# Patient Record
Sex: FEMALE | Race: WHITE | Employment: FULL TIME | URBAN - METROPOLITAN AREA
[De-identification: names, ages, dates, MRNs, and addresses within clinical notes are randomized per-mention and may not be internally consistent; named-entity substitution may affect disease eponyms.]

---

## 2017-03-27 ENCOUNTER — TELEPHONE (OUTPATIENT)
Dept: CARDIOLOGY CLINIC | Age: 52
End: 2017-03-27

## 2017-03-27 DIAGNOSIS — I10 ESSENTIAL HYPERTENSION, BENIGN: ICD-10-CM

## 2017-03-27 DIAGNOSIS — E78.5 DYSLIPIDEMIA: Primary | ICD-10-CM

## 2017-03-27 RX ORDER — ROSUVASTATIN CALCIUM 5 MG/1
TABLET, COATED ORAL
Qty: 1 TAB | Refills: 0 | OUTPATIENT
Start: 2017-03-27

## 2017-03-27 RX ORDER — POTASSIUM CHLORIDE 20 MEQ/1
TABLET, EXTENDED RELEASE ORAL
Qty: 1 TAB | Refills: 0 | OUTPATIENT
Start: 2017-03-27

## 2017-03-27 NOTE — TELEPHONE ENCOUNTER
Spoke with patient. Verified patient with two patient identifiers. Received refill request for K+ and Crestor. Overdue for appt. States she has been off Crestor, lost weight and wants to see if she can control chol off Crestor. States she does not need refill on K+ at this time. Wants CMP done as well as fasting chol. Will mail lab slip to pt. She is calling to schedule appt. Patient verbalized understanding.

## 2017-04-07 RX ORDER — ROSUVASTATIN CALCIUM 5 MG/1
TABLET, COATED ORAL
Qty: 1 TAB | Refills: 0 | OUTPATIENT
Start: 2017-04-07

## 2017-04-07 RX ORDER — POTASSIUM CHLORIDE 20 MEQ/1
TABLET, EXTENDED RELEASE ORAL
Qty: 2 TAB | Refills: 0 | OUTPATIENT
Start: 2017-04-07

## 2017-04-07 NOTE — TELEPHONE ENCOUNTER
Overdue for appt, still has not scheduled appt. And, see prior charted note. Pt does not need either med refilled.

## 2017-04-14 ENCOUNTER — HOSPITAL ENCOUNTER (OUTPATIENT)
Dept: MRI IMAGING | Age: 52
Discharge: HOME | End: 2017-04-14
Attending: FAMILY MEDICINE
Payer: COMMERCIAL

## 2017-04-14 ENCOUNTER — HOSPITAL ENCOUNTER (OUTPATIENT)
Dept: MAMMOGRAPHY | Age: 52
Discharge: HOME | End: 2017-04-14
Attending: FAMILY MEDICINE
Payer: COMMERCIAL

## 2017-04-14 DIAGNOSIS — Z80.3 FAMILY HISTORY OF BREAST CANCER: ICD-10-CM

## 2017-04-14 PROCEDURE — 77066 DX MAMMO INCL CAD BI: CPT

## 2017-04-14 PROCEDURE — 74011250636 HC RX REV CODE- 250/636: Performed by: FAMILY MEDICINE

## 2017-04-14 PROCEDURE — 77059 MRI BREAST BI W WO CONT: CPT

## 2017-04-14 PROCEDURE — A9585 GADOBUTROL INJECTION: HCPCS | Performed by: FAMILY MEDICINE

## 2017-04-14 RX ADMIN — GADOBUTROL 8 ML: 604.72 INJECTION INTRAVENOUS at 10:32

## 2017-05-02 LAB
ALBUMIN SERPL-MCNC: 3.9 G/DL (ref 3.5–5.5)
ALBUMIN/GLOB SERPL: 1.5 {RATIO} (ref 1.2–2.2)
ALP SERPL-CCNC: 51 IU/L (ref 39–117)
ALT SERPL-CCNC: 28 IU/L (ref 0–32)
AST SERPL-CCNC: 23 IU/L (ref 0–40)
BILIRUB SERPL-MCNC: 0.5 MG/DL (ref 0–1.2)
BUN SERPL-MCNC: 16 MG/DL (ref 6–24)
BUN/CREAT SERPL: 22 (ref 9–23)
CALCIUM SERPL-MCNC: 9.2 MG/DL (ref 8.7–10.2)
CHLORIDE SERPL-SCNC: 101 MMOL/L (ref 96–106)
CHOLEST SERPL-MCNC: 196 MG/DL (ref 100–199)
CK SERPL-CCNC: 64 U/L (ref 24–173)
CO2 SERPL-SCNC: 28 MMOL/L (ref 18–29)
CREAT SERPL-MCNC: 0.74 MG/DL (ref 0.57–1)
GLOBULIN SER CALC-MCNC: 2.6 G/DL (ref 1.5–4.5)
GLUCOSE SERPL-MCNC: 91 MG/DL (ref 65–99)
HDLC SERPL-MCNC: 47 MG/DL
INTERPRETATION, 910389: NORMAL
LDLC SERPL CALC-MCNC: 132 MG/DL (ref 0–99)
POTASSIUM SERPL-SCNC: 4 MMOL/L (ref 3.5–5.2)
PROT SERPL-MCNC: 6.5 G/DL (ref 6–8.5)
SODIUM SERPL-SCNC: 145 MMOL/L (ref 134–144)
TRIGL SERPL-MCNC: 86 MG/DL (ref 0–149)
VLDLC SERPL CALC-MCNC: 17 MG/DL (ref 5–40)

## 2017-05-03 ENCOUNTER — TELEPHONE (OUTPATIENT)
Dept: CARDIOLOGY CLINIC | Age: 52
End: 2017-05-03

## 2017-05-03 ENCOUNTER — OFFICE VISIT (OUTPATIENT)
Dept: CARDIOLOGY CLINIC | Age: 52
End: 2017-05-03

## 2017-05-03 VITALS
BODY MASS INDEX: 28.65 KG/M2 | OXYGEN SATURATION: 99 % | WEIGHT: 167.8 LBS | DIASTOLIC BLOOD PRESSURE: 86 MMHG | SYSTOLIC BLOOD PRESSURE: 138 MMHG | RESPIRATION RATE: 18 BRPM | HEART RATE: 80 BPM | HEIGHT: 64 IN

## 2017-05-03 DIAGNOSIS — E78.5 DYSLIPIDEMIA: ICD-10-CM

## 2017-05-03 DIAGNOSIS — I50.42 CHRONIC COMBINED SYSTOLIC AND DIASTOLIC HEART FAILURE (HCC): ICD-10-CM

## 2017-05-03 DIAGNOSIS — I42.9 SECONDARY CARDIOMYOPATHY (HCC): ICD-10-CM

## 2017-05-03 DIAGNOSIS — I10 ESSENTIAL HYPERTENSION, BENIGN: Primary | ICD-10-CM

## 2017-05-03 NOTE — PROGRESS NOTES
Chief Complaint   Patient presents with    Hypertension     Yearly appt, discuss cholesterol results. Denied cardiac symptoms.

## 2017-05-03 NOTE — TELEPHONE ENCOUNTER
----- Message from Paris Medina MD sent at 5/2/2017 10:17 PM EDT -----  Regarding: lipids  Not at goal--way up    Is she taking??    If so needs to be increased and repeat  Months    b  ----- Message -----     From: Rito Olson Lab Results In     Sent: 5/2/2017   8:38 AM       To: Paris Medina MD

## 2017-05-03 NOTE — MR AVS SNAPSHOT
Visit Information Date & Time Provider Department Dept. Phone Encounter #  
 5/3/2017  3:15 PM Elsa Doyle, [address] [place] [address] Cardiology Associate [facility] [phone] [id number] Upcoming Health Maintenance Date Due Hepatitis C Screening 1965 Pneumococcal 19-64 Medium Risk (1 of 1 - PPSV23) 1/21/1984 DTaP/Tdap/Td series (1 - Tdap) 1/21/1986 PAP AKA CERVICAL CYTOLOGY 1/21/1986 FOBT Q 1 YEAR AGE 50-75 1/21/2015 INFLUENZA AGE 9 TO ADULT 8/1/2017 BREAST CANCER SCRN MAMMOGRAM 4/14/2019 Allergies as of 5/3/2017  Review Complete On: 5/3/2017 By: Mar Zabala LPN No Known Allergies Current Immunizations  Never Reviewed No immunizations on file. Not reviewed this visit You Were Diagnosed With   
  
 Codes Comments Essential hypertension, benign    -  Primary ICD-10-CM: I10 
ICD-9-CM: 401.1 Vitals BP Pulse Resp Height(growth percentile) Weight(growth percentile) SpO2  
 138/86 (BP 1 Location: Right arm, BP Patient Position: Sitting) 80 18 5' 4\" (1.626 m) 167 lb 12.8 oz (76.1 kg) 99% BMI OB Status Smoking Status 28.8 kg/m2 Having regular periods Never Smoker Vitals History BMI and BSA Data Body Mass Index Body Surface Area  
 28.8 kg/m 2 1.85 m 2 Preferred Pharmacy Pharmacy Name Phone [id number] [address] [place] [address] [phone] Your Updated Medication List  
  
   
This list is accurate as of: 5/3/17  3:58 PM.  Always use your most recent med list.  
  
  
  
  
 aspirin 81 mg tablet Take 81 mg by mouth daily. CENTRUM CARDIO 3-200-400 mg-mcg-mg Tab Generic drug:  Mv,Ca,Iron,Min-FA-Phytosterol Take  by mouth. cholecalciferol (vitamin D3) 2,000 unit Tab Take  by mouth. COREG 6.25 mg tablet Generic drug:  carvedilol  
take 1 tablet by mouth once daily COZAAR 50 mg tablet Generic drug:  losartan take 1 tablet by mouth once daily  
  
 hydroCHLOROthiazide 25 mg tablet Commonly known as:  HYDRODIURIL  
take 1 tablet by mouth once daily  
  
 krill oil 500 mg Cap Take  by mouth daily. ondansetron 4 mg disintegrating tablet Commonly known as:  ZOFRAN ODT Take 1 Tab by mouth every eight (8) hours as needed for Nausea. potassium chloride 20 mEq tablet Commonly known as:  K-DUR, KLOR-CON  
take 1 tablet by mouth twice a day  
  
 rosuvastatin 5 mg tablet Commonly known as:  CRESTOR  
take 1 tablet by mouth every evening UBIQUINONE PO Take  by mouth daily. We Performed the Following AMB POC EKG ROUTINE W/ 12 LEADS, INTER & REP [58758 CPT(R)] Introducing [facility] SERVICES! Dear Bret Tellez: Thank you for requesting a [employer] account. Our records indicate that you already have an active [employer] account. You can access your account anytime at [email or web address] Did you know that you can access your hospital and ER discharge instructions at any time in [employer]? You can also review all of your test results from your hospital stay or ER visit. Additional Information If you have questions, please visit the Frequently Asked Questions section of the [employer] website at [email or web address] Remember, [employer] is NOT to be used for urgent needs. For medical emergencies, dial 911. Now available from your iPhone and Android! Please provide this summary of care documentation to your next provider. Your primary care clinician is listed as [facility]. If you have any questions after today's visit, please call [phone].

## 2017-05-04 ENCOUNTER — TELEPHONE (OUTPATIENT)
Dept: CARDIOLOGY CLINIC | Age: 52
End: 2017-05-04

## 2017-05-04 PROBLEM — R79.89 ELEVATED LFTS: Status: ACTIVE | Noted: 2017-05-04

## 2017-05-04 PROBLEM — R79.89 ELEVATED LFTS: Status: RESOLVED | Noted: 2017-05-04 | Resolved: 2017-05-04

## 2017-05-04 NOTE — PROGRESS NOTES
[address] [facility]        [phone]                             NEW PATIENT HPI/FOLLOW-UP    NAME:  Quynh Dalal   :   1965   MRN:   [id number]   PCP:  Chrissy Bob MD           Subjective: The patient is a 46y.o. year old female  who returns for a routine follow-up yearly f/u. Since the last visit, patient reports no new symptoms. Has remarkably lost >20 lbs on different diet. Denies change in exercise tolerance, chest pain, edema, medication intolerance, palpitations, shortness of breath, PND/orthopnea wheezing, sputum, syncope, dizziness or light headedness. Doing satisfactorily. Review of Systems  General: Endorses controlled weight loss. Pt is able to conduct ADL's. Respiratory: Denies shortness of breath, HAMMOND, wheezing or stridor. Cardiovascular: Denies precordial pain, palpitations, edema or PND  Gastrointestinal: Denies poor appetite, indigestion, abdominal pain or blood in stool  Peripheral vascular: Denies claudication, leg cramps  Neuropsychiatric: Denies paresthesias,tingling,numbness,anxiety,depression,fatigue  Musculoskeletal: Denies pain,tenderness, soreness,swelling      Past Medical History:   Diagnosis Date    Edema     Essential hypertension     Essential hypertension, benign     well controlled    Mitral valve disorders     Mixed hyperlipidemia      Patient Active Problem List    Diagnosis Date Noted    Obesity 2014    Essential hypertension, benign 2012    Dyslipidemia 2012    Secondary cardiomyopathy--resolved 2012    Chronic combined systolic and diastolic heart failure--resolved 2012    Nephrolithiasis 2012      No past surgical history on file.   No Known Allergies   Family History   Problem Relation Age of Onset    Breast Cancer Mother 52      Social History     Social History    Marital status:      Spouse name: N/A    Number of children: N/A    Years of education: N/A     Occupational History    Not on file. Social History Main Topics    Smoking status: Never Smoker    Smokeless tobacco: Never Used    Alcohol use No    Drug use: Not on file    Sexual activity: Not on file     Other Topics Concern    Not on file     Social History Narrative      Current Outpatient Prescriptions   Medication Sig    COZAAR 50 mg tablet take 1 tablet by mouth once daily    hydrochlorothiazide (HYDRODIURIL) 25 mg tablet take 1 tablet by mouth once daily    COREG 6.25 mg tablet take 1 tablet by mouth once daily    potassium chloride (K-DUR, KLOR-CON) 20 mEq tablet take 1 tablet by mouth twice a day    krill oil 500 mg cap Take  by mouth daily.  Mv,Ca,Iron,Min-FA-Phytosterol (CENTRUM CARDIO) 3-200-400 mg-mcg-mg Tab Take  by mouth.  aspirin 81 mg tablet Take 81 mg by mouth daily.  ondansetron (ZOFRAN ODT) 4 mg disintegrating tablet Take 1 Tab by mouth every eight (8) hours as needed for Nausea.  rosuvastatin (CRESTOR) 5 mg tablet take 1 tablet by mouth every evening    UBIQUINONE PO Take  by mouth daily.  cholecalciferol, vitamin D3, 2,000 unit Tab Take  by mouth. No current facility-administered medications for this visit. I have reviewed the nurses notes, vitals, problem list, allergy list, medical history, family medical, social history and medications. Objective:     Physical Exam:     Vitals:    05/03/17 1519 05/03/17 1528   BP: 138/88 138/86   Pulse: 80    Resp: 18    SpO2: 99%    Weight: 167 lb 12.8 oz (76.1 kg)    Height: 5' 4\" (1.626 m)     Body mass index is 28.8 kg/(m^2). General: Well developed, in no acute distress. HEENT: No carotid bruits, no JVD, trach is midline. Heart:  Normal S1/S2 negative S3 or S4. Regular, no murmur, gallop or rub.   Respiratory: Clear bilaterally, no wheezing or rales  Abdomen:   Soft, non-tender, bowel sounds are active.   Extremities:  No edema, normal cap refill, no cyanosis.   Neuro: A&Ox3, speech clear, gait stable. Skin: Skin color is normal. No rashes or lesions. No diaphoresis. Vascular: 2+ pulses symmetric in all extremities        Data Review:       Cardiographics:    EKG: NSR,very minor non-specific ST-T wave changes    Cardiology Labs:    Results for orders placed or performed during the hospital encounter of 08/26/16   EKG, 12 LEAD, INITIAL   Result Value Ref Range    Ventricular Rate 104 BPM    Atrial Rate 104 BPM    P-R Interval 170 ms    QRS Duration 88 ms    Q-T Interval 346 ms    QTC Calculation (Bezet) 454 ms    Calculated P Axis 41 degrees    Calculated R Axis 1 degrees    Calculated T Axis -18 degrees    Diagnosis       Sinus tachycardia  Nonspecific ST and T wave abnormality  No previous ECGs available  Confirmed by José Miguel Amaro ([id number]) on 8/27/2016 12:27:14 PM         Lab Results   Component Value Date/Time    Cholesterol, total 196 05/01/2017 07:43 AM    HDL Cholesterol 47 05/01/2017 07:43 AM    LDL, calculated 132 05/01/2017 07:43 AM    Triglyceride 86 05/01/2017 07:43 AM       Lab Results   Component Value Date/Time    Sodium 145 05/01/2017 07:43 AM    Potassium 4.0 05/01/2017 07:43 AM    Chloride 101 05/01/2017 07:43 AM    CO2 28 05/01/2017 07:43 AM    Anion gap 7 08/26/2016 10:04 PM    Glucose 91 05/01/2017 07:43 AM    BUN 16 05/01/2017 07:43 AM    Creatinine 0.74 05/01/2017 07:43 AM    BUN/Creatinine ratio 22 05/01/2017 07:43 AM    GFR est  05/01/2017 07:43 AM    GFR est non-AA 93 05/01/2017 07:43 AM    Calcium 9.2 05/01/2017 07:43 AM    Bilirubin, total 0.5 05/01/2017 07:43 AM    AST (SGOT) 23 05/01/2017 07:43 AM    Alk. phosphatase 51 05/01/2017 07:43 AM    Protein, total 6.5 05/01/2017 07:43 AM    Albumin 3.9 05/01/2017 07:43 AM    Globulin 4.4 08/26/2016 10:04 PM    A-G Ratio 1.5 05/01/2017 07:43 AM    ALT (SGPT) 28 05/01/2017 07:43 AM          Assessment:       ICD-10-CM ICD-9-CM    1.  Essential hypertension, benign I10 401.1 AMB POC EKG ROUTINE W/ 12 LEADS, INTER & REP   2. Dyslipidemia E78.5 272.4 CK      LIPID PANEL      HEPATIC FUNCTION PANEL   3. Chronic combined systolic and diastolic heart failure--resolved I50.42 428.42    4. Secondary cardiomyopathy (HCC)--resolved I42.9 425.9          Discussion: Patient presents at this time stable from a cardiac perspective. Has happily lost > 20 lbs on special diet and feels great and is personally promoting to those who will lend an ear. Accomplishment acknowledged. Took her self off small dose Crestor Lipid level soared-- from 79. LFT's WNL. Will have restart Crestor 5 mg every other day as patient concerned about being on statins. Repeat labs in 3 months. Plan: 1. Continue same meds. Lipid profile and labs as above. 2.Encouraged to exercise to tolerance and follow low fat, low cholesterol, low sodium predominantly Plant-based (consider Mediterranean) diet. Call with questions or concerns. Will follow up any test results by phone and/or f/u here in office if needed. Jose Antonio Eduardo 3.Follow up: 1 year    I have discussed the diagnosis with the patient and the intended plan as seen in the above orders. The patient has received an after-visit summary and questions were answered concerning future plans. I have discussed any concerning medication side effects and warnings with the patient as well.     Arlin Tarango MD  5/4/2017

## 2017-05-04 NOTE — TELEPHONE ENCOUNTER
----- Message from Reid Greer MD sent at 5/4/2017 10:27 AM EDT -----  Regarding: LFT'S  EV;    WOULD U LET ANTONIO KNOW THAT HER LFT'S WERE NORMAL ON AND OFF CRESTOR. IF ANYTHING ONE OF LFT'S WAS A \"TICK\" BELOW NORMAL    CONGRATS AGAIN TO HER FOR \"MARY BETH WEIGHT LOSS\". .DONT LOSE WEIGHT TO OBLIVION THOUGH:::))).     B

## 2017-05-04 NOTE — TELEPHONE ENCOUNTER
Spoke with patient. Verified patient with two patient identifiers. Discussed all with pt. Patient verbalized understanding.

## 2017-05-08 RX ORDER — POTASSIUM CHLORIDE 20 MEQ/1
TABLET, EXTENDED RELEASE ORAL
Qty: 60 TAB | Refills: 11 | Status: SHIPPED | OUTPATIENT
Start: 2017-05-08 | End: 2017-05-30 | Stop reason: SDUPTHER

## 2017-05-08 RX ORDER — ROSUVASTATIN CALCIUM 5 MG/1
TABLET, COATED ORAL
Qty: 15 TAB | Refills: 5 | Status: SHIPPED | OUTPATIENT
Start: 2017-05-08 | End: 2018-05-02 | Stop reason: DRUGHIGH

## 2017-05-30 RX ORDER — LOSARTAN POTASSIUM 50 MG/1
TABLET, FILM COATED ORAL
Qty: 30 TAB | Refills: 10 | Status: SHIPPED | OUTPATIENT
Start: 2017-05-30 | End: 2018-02-07 | Stop reason: SDUPTHER

## 2017-05-30 RX ORDER — POTASSIUM CHLORIDE 20 MEQ/1
TABLET, EXTENDED RELEASE ORAL
Qty: 60 TAB | Refills: 10 | Status: SHIPPED | OUTPATIENT
Start: 2017-05-30 | End: 2018-02-07 | Stop reason: SDUPTHER

## 2017-05-30 RX ORDER — HYDROCHLOROTHIAZIDE 25 MG/1
TABLET ORAL
Qty: 30 TAB | Refills: 10 | Status: SHIPPED | OUTPATIENT
Start: 2017-05-30 | End: 2018-02-07 | Stop reason: SDUPTHER

## 2017-05-30 RX ORDER — CARVEDILOL 6.25 MG/1
TABLET ORAL
Qty: 30 TAB | Refills: 10 | Status: SHIPPED | OUTPATIENT
Start: 2017-05-30 | End: 2018-02-07 | Stop reason: SDUPTHER

## 2018-01-26 LAB
ALBUMIN SERPL-MCNC: 4.4 G/DL (ref 3.5–5.5)
ALP SERPL-CCNC: 58 IU/L (ref 39–117)
ALT SERPL-CCNC: 21 IU/L (ref 0–32)
AST SERPL-CCNC: 21 IU/L (ref 0–40)
BILIRUB DIRECT SERPL-MCNC: 0.1 MG/DL (ref 0–0.4)
BILIRUB SERPL-MCNC: 0.4 MG/DL (ref 0–1.2)
CHOLEST SERPL-MCNC: 218 MG/DL (ref 100–199)
CK SERPL-CCNC: 63 U/L (ref 24–173)
HDLC SERPL-MCNC: 74 MG/DL
LDLC SERPL CALC-MCNC: 128 MG/DL (ref 0–99)
PROT SERPL-MCNC: 7 G/DL (ref 6–8.5)
TRIGL SERPL-MCNC: 82 MG/DL (ref 0–149)
VLDLC SERPL CALC-MCNC: 16 MG/DL (ref 5–40)

## 2018-01-29 ENCOUNTER — TELEPHONE (OUTPATIENT)
Dept: CARDIOLOGY CLINIC | Age: 53
End: 2018-01-29

## 2018-01-29 NOTE — TELEPHONE ENCOUNTER
----- Message from Bartolo Torres MD sent at 1/26/2018  3:56 PM EST -----  Regarding: lipids,liver,cpk  Lipids same    cpk and liver normal    Will discuss    b  ----- Message -----     From: Rito Olson Lab Results In     Sent: 1/26/2018   8:43 AM       To: Bartolo Torres MD

## 2018-01-29 NOTE — TELEPHONE ENCOUNTER
DERRICKVM to call me. (Is pt still off Crestor completely or did she resume, what dose? If she cannot tolerate Crestor, can try Pravastatin. May have to consider Repatha injections, but expensive).

## 2018-01-30 NOTE — TELEPHONE ENCOUNTER
Spoke with patient, ph # [phone]. Verified patient with two patient identifiers. Has been on Crestor 5 mg every other day, chol 218, . LFT's were elevated in the past, thought possibly due to Crestor, but not sure. LFT's did normalize on lower dose. Pt would like to try ^ Crestor to 5 mg every day, getting name brand, and repeat labs in 6 wks to check LFT's. Will make sure Dr. Ashanti Ewing approves since LFT's results elevated in the past.    Needs refill on Crestor 5 mg qd, dispense as written for name brand only. Please advise.

## 2018-01-31 RX ORDER — ROSUVASTATIN CALCIUM 5 MG/1
TABLET, COATED ORAL
COMMUNITY
End: 2018-01-31 | Stop reason: SDUPTHER

## 2018-01-31 NOTE — TELEPHONE ENCOUNTER
Per Dr. Marty Avalos, ^ Crestor to 5 mg po qhs, pt requesting name brand only. Phoned in, would not E-scribe due to Pl. Vanita Holman. Spoke with pharmacist, advised name brand only.

## 2018-01-31 NOTE — TELEPHONE ENCOUNTER
Spoke with patient. Verified patient with two patient identifiers. Per Dr. Meño Stover, ^ Crestor (name brand) to 5 mg po qhs. Repeat LFT's in 6 wks (had been elevated in the past). Mailing lab slip to pt. Sending in Rx for name brand only. Patient verbalized understanding.

## 2018-02-01 RX ORDER — ROSUVASTATIN CALCIUM 5 MG/1
5 TABLET, COATED ORAL
Qty: 60 TAB | Refills: 1 | OUTPATIENT
Start: 2018-02-01 | End: 2018-05-02 | Stop reason: DRUGHIGH

## 2018-02-07 NOTE — TELEPHONE ENCOUNTER
Spoke with patient. Verified patient with two patient identifiers.       Refilling all meds Name Brand only per pt request.    Brand name only

## 2018-02-08 RX ORDER — CARVEDILOL 6.25 MG
TABLET ORAL
Qty: 30 TAB | Refills: 3 | OUTPATIENT
Start: 2018-02-08 | End: 2018-06-18 | Stop reason: SDUPTHER

## 2018-02-08 RX ORDER — LOSARTAN POTASSIUM 50 MG/1
TABLET, FILM COATED ORAL
Qty: 30 TAB | Refills: 3 | OUTPATIENT
Start: 2018-02-08 | End: 2018-07-06 | Stop reason: SDUPTHER

## 2018-02-08 RX ORDER — POTASSIUM CHLORIDE 20 MEQ/1
TABLET, EXTENDED RELEASE ORAL
Qty: 60 TAB | Refills: 3 | OUTPATIENT
Start: 2018-02-08 | End: 2019-02-01 | Stop reason: SDUPTHER

## 2018-02-08 RX ORDER — HYDROCHLOROTHIAZIDE 25 MG/1
TABLET ORAL
Qty: 30 TAB | Refills: 3 | OUTPATIENT
Start: 2018-02-08 | End: 2018-08-26 | Stop reason: SDUPTHER

## 2018-03-05 ENCOUNTER — TELEPHONE (OUTPATIENT)
Dept: CARDIOLOGY CLINIC | Age: 53
End: 2018-03-05

## 2018-03-05 NOTE — TELEPHONE ENCOUNTER
Received fax from [address] [facility] patient with two patient identifiers. [facility] RE:    Asking for Rx for Coreg NYASIA. Has already been done on 2-8-17, # 30 with 3 refills, Brand name only. Advised to update their records. Appt is due in May 2018.

## 2018-03-14 ENCOUNTER — TELEPHONE (OUTPATIENT)
Dept: CARDIOLOGY CLINIC | Age: 53
End: 2018-03-14

## 2018-03-14 DIAGNOSIS — E78.5 DYSLIPIDEMIA: Primary | ICD-10-CM

## 2018-03-14 DIAGNOSIS — Z79.899 DRUG THERAPY: ICD-10-CM

## 2018-03-14 DIAGNOSIS — R79.89 ABNORMAL LIVER FUNCTION TESTS: ICD-10-CM

## 2018-03-14 NOTE — TELEPHONE ENCOUNTER
----- Message from Herber Klein MD sent at 1/26/2018  3:56 PM EST -----  Regarding: lipids,liver,cpk  Lipids same    cpk and liver normal    Will discuss    b  ----- Message -----     From: Rito Olson Lab Results In     Sent: 1/26/2018   8:43 AM       To: Herber Klein MD

## 2018-03-30 NOTE — TELEPHONE ENCOUNTER
Spoke with patient. Verified patient with two patient identifiers. On Crestor 5 mg qhs. Has had ^ LFT's in past.    Advised she needs her LFT's drawn. States she will go next week. LFT's were needed in 6 wks, this is now 8 wks. Patient verbalized understanding.

## 2018-04-09 ENCOUNTER — TELEPHONE (OUTPATIENT)
Dept: CARDIOLOGY CLINIC | Age: 53
End: 2018-04-09

## 2018-04-09 NOTE — TELEPHONE ENCOUNTER
Spoke with Teresa with [facility], [phone] for PA for name brand Coreg. Verified patient with two patient identifiers. Case ID # [id number]. Coreg approved 4-1-18 thru 5-1-19. Notified [facility] at [phone].

## 2018-04-12 ENCOUNTER — TELEPHONE (OUTPATIENT)
Dept: CARDIOLOGY CLINIC | Age: 53
End: 2018-04-12

## 2018-04-30 NOTE — TELEPHONE ENCOUNTER
Received labs including FLP, LFT's from PCP. (CK was not done, though was included on order printed by us for pt to have done). Dr. Martin Reading to review labs this week at [facility] office.

## 2018-04-30 NOTE — TELEPHONE ENCOUNTER
Spoke with patient. Verified patient with two patient identifiers. States she had her LFT's done at PCP. Advised I cannot refill her Crestor until I have results. She is getting the results faxed to us. Patient verbalized understanding.

## 2018-05-02 NOTE — TELEPHONE ENCOUNTER
Fasting labs reviewed by Dr. Adry Frost. Per Dr. Adry Frost, continue Crestor 5 mg po qhs. Repeat LFT's and CK in 3 months, if okay at that time, will repeat at 6 months. Patient verbalized understanding.

## 2018-05-03 RX ORDER — ROSUVASTATIN CALCIUM 5 MG/1
5 TABLET, COATED ORAL
Qty: 30 TAB | Refills: 5 | Status: SHIPPED | OUTPATIENT
Start: 2018-05-03 | End: 2018-05-03 | Stop reason: SDUPTHER

## 2018-05-03 RX ORDER — ROSUVASTATIN CALCIUM 5 MG/1
5 TABLET, COATED ORAL
Qty: 30 TAB | Refills: 5 | OUTPATIENT
Start: 2018-05-03 | End: 2018-07-06 | Stop reason: SDUPTHER

## 2018-06-04 ENCOUNTER — HOSPITAL ENCOUNTER (OUTPATIENT)
Dept: MAMMOGRAPHY | Age: 53
Discharge: HOME | End: 2018-06-04
Attending: FAMILY MEDICINE
Payer: COMMERCIAL

## 2018-06-04 ENCOUNTER — HOSPITAL ENCOUNTER (OUTPATIENT)
Dept: MRI IMAGING | Age: 53
Discharge: HOME | End: 2018-06-04
Attending: FAMILY MEDICINE
Payer: COMMERCIAL

## 2018-06-04 VITALS — WEIGHT: 168 LBS | BODY MASS INDEX: 28.84 KG/M2

## 2018-06-04 DIAGNOSIS — Z80.3 FAMILY HISTORY OF BREAST CANCER: ICD-10-CM

## 2018-06-04 DIAGNOSIS — Z12.31 VISIT FOR SCREENING MAMMOGRAM: ICD-10-CM

## 2018-06-04 PROCEDURE — 0159T MRI BREAST BI W WO CONT: CPT

## 2018-06-04 PROCEDURE — 74011000258 HC RX REV CODE- 258: Performed by: FAMILY MEDICINE

## 2018-06-04 PROCEDURE — 77063 BREAST TOMOSYNTHESIS BI: CPT

## 2018-06-04 PROCEDURE — A9585 GADOBUTROL INJECTION: HCPCS | Performed by: FAMILY MEDICINE

## 2018-06-04 PROCEDURE — 74011250636 HC RX REV CODE- 250/636: Performed by: FAMILY MEDICINE

## 2018-06-04 RX ADMIN — SODIUM CHLORIDE 100 ML: 9 INJECTION, SOLUTION INTRAVENOUS at 10:10

## 2018-06-04 RX ADMIN — GADOBUTROL 7.5 ML: 604.72 INJECTION INTRAVENOUS at 10:10

## 2018-06-18 NOTE — TELEPHONE ENCOUNTER
[facility] Case ID # [id number]. Coreg approved 4-1-18 thru 5-1-19    Phoned in, V.O. Per Dr. Kj Kim. Would not E-scribe. Was due for appt in May 2018, pt calling to schedule appt.

## 2018-06-19 RX ORDER — CARVEDILOL 6.25 MG
TABLET ORAL
Qty: 30 TAB | Refills: 3 | OUTPATIENT
Start: 2018-06-19 | End: 2018-07-30 | Stop reason: SDUPTHER

## 2018-07-05 ENCOUNTER — TELEPHONE (OUTPATIENT)
Dept: CARDIOLOGY CLINIC | Age: 53
End: 2018-07-05

## 2018-07-05 NOTE — TELEPHONE ENCOUNTER
Binh Ahn at [phone] for prior auth for Crestor 5 mg po qhs and Cozaar 50 mg po every day. States both need reviews, she is faxing forms to be completed and faxed back for both meds.

## 2018-07-06 NOTE — TELEPHONE ENCOUNTER
Rx. Phoned in, V.O. Per Dr. America Osman, would not E-scribe. Spoke with patient. Verified patient with two patient identifiers. [facility] will not approve, must try two generics first.    Pt agrees to generics.

## 2018-07-26 RX ORDER — ROSUVASTATIN CALCIUM 5 MG/1
5 TABLET, COATED ORAL
Qty: 30 TAB | Refills: 5 | OUTPATIENT
Start: 2018-07-26

## 2018-07-26 RX ORDER — LOSARTAN POTASSIUM 50 MG/1
TABLET ORAL
Qty: 30 TAB | Refills: 5 | OUTPATIENT
Start: 2018-07-26

## 2018-07-31 RX ORDER — CARVEDILOL 6.25 MG/1
TABLET ORAL
Qty: 30 TAB | Refills: 0 | OUTPATIENT
Start: 2018-07-31 | End: 2018-09-09 | Stop reason: SDUPTHER

## 2018-08-26 RX ORDER — HYDROCHLOROTHIAZIDE 25 MG/1
TABLET ORAL
Qty: 30 TAB | Refills: 3 | Status: SHIPPED | OUTPATIENT
Start: 2018-08-26

## 2018-09-10 RX ORDER — CARVEDILOL 6.25 MG/1
TABLET ORAL
Qty: 21 TAB | Refills: 0 | Status: SHIPPED | OUTPATIENT
Start: 2018-09-10 | End: 2018-09-28 | Stop reason: SDUPTHER

## 2018-09-10 NOTE — TELEPHONE ENCOUNTER
Was due back in May 2018, has not scheduled appt. Spoke with patient. Verified patient with two patient identifiers. Advised overdue for appt. States she is working 12 hr days and can only come on certain days. Advised I cannot fill until she schedules the appt. Is calling to schedule appt.

## 2018-09-28 DIAGNOSIS — I10 ESSENTIAL HYPERTENSION, BENIGN: Primary | ICD-10-CM

## 2018-09-28 RX ORDER — CARVEDILOL 6.25 MG/1
TABLET ORAL
Qty: 30 TAB | Refills: 0 | Status: SHIPPED | OUTPATIENT
Start: 2018-09-28 | End: 2018-09-28 | Stop reason: SDUPTHER

## 2018-09-28 RX ORDER — CARVEDILOL 6.25 MG/1
TABLET ORAL
Qty: 60 TAB | Refills: 1 | Status: SHIPPED | OUTPATIENT
Start: 2018-09-28 | End: 2019-03-10 | Stop reason: SDUPTHER

## 2019-02-01 RX ORDER — POTASSIUM CHLORIDE 20 MEQ/1
TABLET, EXTENDED RELEASE ORAL
Qty: 60 TAB | Refills: 3 | OUTPATIENT
Start: 2019-02-01

## 2019-02-02 RX ORDER — POTASSIUM CHLORIDE 20 MEQ/1
TABLET, EXTENDED RELEASE ORAL
Qty: 1 TAB | Refills: 0 | Status: SHIPPED | OUTPATIENT
Start: 2019-02-02

## 2019-03-10 RX ORDER — CARVEDILOL 6.25 MG/1
TABLET ORAL
Qty: 60 TAB | Refills: 0 | Status: SHIPPED | OUTPATIENT
Start: 2019-03-10 | End: 2019-03-11 | Stop reason: SDUPTHER

## 2019-03-11 RX ORDER — CARVEDILOL 6.25 MG/1
TABLET ORAL
Qty: 3 TAB | Refills: 0 | Status: SHIPPED | OUTPATIENT
Start: 2019-03-11 | End: 2019-12-07 | Stop reason: DRUGHIGH

## 2019-05-13 ENCOUNTER — TELEPHONE (OUTPATIENT)
Dept: CARDIOLOGY CLINIC | Age: 54
End: 2019-05-13

## 2019-05-21 ENCOUNTER — OFFICE VISIT (OUTPATIENT)
Dept: PRIMARY CARE CLINIC | Age: 54
End: 2019-05-21

## 2019-05-21 VITALS
HEIGHT: 64 IN | HEART RATE: 71 BPM | OXYGEN SATURATION: 98 % | DIASTOLIC BLOOD PRESSURE: 89 MMHG | RESPIRATION RATE: 16 BRPM | BODY MASS INDEX: 32.58 KG/M2 | SYSTOLIC BLOOD PRESSURE: 129 MMHG | TEMPERATURE: 98 F | WEIGHT: 190.8 LBS

## 2019-05-21 DIAGNOSIS — L23.7 POISON IVY: Primary | ICD-10-CM

## 2019-05-21 RX ORDER — PREDNISONE 20 MG/1
40 TABLET ORAL
Qty: 10 TAB | Refills: 0 | Status: SHIPPED | OUTPATIENT
Start: 2019-05-21 | End: 2019-05-26

## 2019-05-21 NOTE — PROGRESS NOTES
Chief Complaint   Patient presents with    Poison Ivy/Poison Oak/Poison Sumac Exposure     Patient with Carolina Mealy areas on hand and left neck, using lotion, not effective, areas on trunk noted also

## 2019-05-21 NOTE — PATIENT INSTRUCTIONS
Poison [place], [place], and [place] in Children: Care Instructions  Your Care Instructions    Poison ivy, poison oak, and poison sumac are plants that can cause a skin rash upon contact. The red, itchy rash often shows up in lines or streaks and may cause fluid-filled blisters or large, raised hives. The rash is caused by an allergic reaction to an oil in poison ivy, oak, and sumac. The rash may occur when your child touches the plant or clothing, pet fur, sporting gear, gardening tools, or other objects that have come in contact with one of these plants. Your child cannot catch or spread the rash, even if he or she touches it or the blister fluid. This is because the plant oil will already have been absorbed or washed off the skin. The rash may seem to be spreading, but either it is still developing from earlier contact or your child has touched something that still has the plant oil on it. Follow-up care is a key part of your child's treatment and safety. Be sure to make and go to all appointments, and call your doctor if your child is having problems. It's also a good idea to know your child's test results and keep a list of the medicines your child takes. How can you care for your child at home? · If your doctor prescribed a cream, use it as directed. If the doctor prescribed medicine, give it exactly as prescribed. Call your doctor if you think your child is having a problem with his or her medicine. · Use cold, wet cloths to reduce itching. · Keep your child cool and out of the sun. · Leave the rash open to the air. · Wash all clothing or other things that may have come in contact with the plant oil. · Avoid most lotions and ointments until the rash heals. Calamine lotion may help relieve symptoms of a plant rash. Use it 3 or 4 times a day.   To prevent poison ivy exposure  If you know that your child might go near poison ivy, oak, or sumac when playing outdoors, use a product (such as Ivy X Pre-Contact Skin Solution) that helps prevent plant oil from getting on the skin. These products come in lotions, sprays, or towelettes. You put the product on the skin right before your child goes outdoors. If you did not use a preventive product and your child has had contact with plant oil, clean it off your child's skin with an after-contact product as soon as possible. These products, such as Tecnu Original Outdoor Skin Cleanser, can also be used to clean plant oil from clothing or tools. When should you call for help? Call your doctor now or seek immediate medical care if:    · Your child has signs of infection, such as:  ? Increased pain, swelling, warmth, or redness. ? Red streaks leading from the rash. ? Pus draining from the rash. ? A fever.    Watch closely for changes in your child's health, and be sure to contact your doctor if:    · Your child does not get better as expected. Where can you learn more? Go to http://matt-monica.info/. Enter R114 in the search box to learn more about \"Poison [place], [place], and [place] in Children: Care Instructions. \"  Current as of: April 17, 2018  Content Version: 11.9  © 6235-2122 [employer]. Care instructions adapted under license by [employer] (which disclaims liability or warranty for this information). If you have questions about a medical condition or this instruction, always ask your healthcare professional. [place] [address] any warranty or liability for your use of this information.

## 2019-05-21 NOTE — PROGRESS NOTES
History of Present Illness     Patient Identification  Sabas Nicolas is a 47 y.o. female. Patient information was obtained from patient. History/Exam limitations: none. Patient presented to the Emergency Department by private vehicle. Chief Complaint   Poison Ivy/Poison Oak/Poison [place] Exposure (Patient with [place] areas on hand and left neck, using lotion, not effective, areas on trunk noted also)      Patient presents for evaluation of rash on torso, trunk. Onset of symptoms was abrupt, and has been unchanged since that time. . Symptoms include itching. Care prior to arrival consisted of nothing, with no relief. Past Medical History:   Diagnosis Date    Edema     Essential hypertension     Essential hypertension, benign     well controlled    Mitral valve disorders(424.0)     Mixed hyperlipidemia      Family History   Problem Relation Age of Onset    Breast Cancer Mother 52     Current Outpatient Medications   Medication Sig Dispense Refill    predniSONE (DELTASONE) 20 mg tablet Take 40 mg by mouth daily (with breakfast) for 5 days. 10 Tab 0    carvedilol (COREG) 6.25 mg tablet TAKE 1/2 TABLET BY MOUTH TWICE A DAY WITH MEALS 3 Tab 0    potassium chloride (K-DUR, KLOR-CON) 20 mEq tablet take 1-2 tablets by mouth twice a day (verified dose with pt) 1 Tab 0    hydroCHLOROthiazide (HYDRODIURIL) 25 mg tablet take 1 tablet by mouth once daily 30 Tab 3    losartan (COZAAR) 50 mg tablet take 1 tablet by mouth once daily 30 Tab 5    rosuvastatin (CRESTOR) 5 mg tablet Take 1 Tab by mouth nightly. 30 Tab 5    krill oil 500 mg cap Take  by mouth daily.  UBIQUINONE PO Take  by mouth daily.  Mv,Ca,Iron,Min-FA-Phytosterol (CENTRUM CARDIO) 3-200-400 mg-mcg-mg Tab Take  by mouth.  cholecalciferol, vitamin D3, 2,000 unit Tab Take  by mouth.  aspirin 81 mg tablet Take 81 mg by mouth daily.        No Known Allergies  Social History     Socioeconomic History    Marital status:  Spouse name: Not on file    Number of children: Not on file    Years of education: Not on file    Highest education level: Not on file   Occupational History    Not on file   Social Needs    Financial resource strain: Not on file    Food insecurity:     Worry: Not on file     Inability: Not on file    Transportation needs:     Medical: Not on file     Non-medical: Not on file   Tobacco Use    Smoking status: Never Smoker    Smokeless tobacco: Never Used   Substance and Sexual Activity    Alcohol use: No    Drug use: Not on file    Sexual activity: Not on file   Lifestyle    Physical activity:     Days per week: Not on file     Minutes per session: Not on file    Stress: Not on file   Relationships    Social connections:     Talks on phone: Not on file     Gets together: Not on file     Attends Jain service: Not on file     Active member of club or organization: Not on file     Attends meetings of clubs or organizations: Not on file     Relationship status: Not on file    Intimate partner violence:     Fear of current or ex partner: Not on file     Emotionally abused: Not on file     Physically abused: Not on file     Forced sexual activity: Not on file   Other Topics Concern    Not on file   Social History Narrative    Not on file     Review of Systems  A comprehensive review of systems was negative except for that written in the HPI. Physical Exam     Visit Vitals  /89   Pulse 71   Temp 98 °F (36.7 °C) (Oral)   Resp 16   Ht 5' 4\" (1.626 m)   Wt 190 lb 12.8 oz (86.5 kg)   LMP 06/04/2015 (Approximate)   SpO2 98%   BMI 32.75 kg/m²     Visit Vitals  /89   Pulse 71   Temp 98 °F (36.7 °C) (Oral)   Resp 16   Ht 5' 4\" (1.626 m)   Wt 190 lb 12.8 oz (86.5 kg)   LMP 06/04/2015 (Approximate)   SpO2 98%   BMI 32.75 kg/m²     General:  Alert, cooperative, no distress, appears stated age. Head:  Normocephalic, without obvious abnormality, atraumatic.    Eyes:  Conjunctivae/corneas clear. PERRL, EOMs intact. Fundi benign. Ears:  Normal TMs and external ear canals both ears. Nose: Nares normal. Septum midline. Mucosa normal. No drainage or sinus tenderness. Throat: Lips, mucosa, and tongue normal. Teeth and gums normal.   Neck: Supple, symmetrical, trachea midline, no adenopathy, thyroid: no enlargement/tenderness/nodules, no carotid bruit and no JVD. Back:   Symmetric, no curvature. ROM normal. No CVA tenderness. Lungs:   Clear to auscultation bilaterally. Chest wall:  No tenderness or deformity. Heart:  Regular rate and rhythm, S1, S2 normal, no murmur, click, rub or gallop. Breast Exam:  No tenderness, masses, or nipple abnormality. Abdomen:   Soft, non-tender. Bowel sounds normal. No masses,  No organomegaly. Genitalia:  Normal female without lesion, discharge or tenderness. Rectal:  Normal tone,  no masses or tenderness  Guaiac negative stool. Extremities: Extremities normal, atraumatic, no cyanosis or edema. Pulses: 2+ and symmetric all extremities. Skin: Skin color, texture, turgor normal. No rashes or lesions. Lymph nodes: Cervical, supraclavicular, and axillary nodes normal.   Neurologic: CNII-XII intact. Normal strength, sensation and reflexes throughout. ED Course     Studies: n/a    Records Reviewed: Old medical records.     Treatments: None    Consultations: none indicated    Disposition: discharged home with steroids patient has OTC topical she will use

## 2019-09-05 ENCOUNTER — HOSPITAL ENCOUNTER (OUTPATIENT)
Dept: MAMMOGRAPHY | Age: 54
Discharge: HOME | End: 2019-09-05
Attending: FAMILY MEDICINE
Payer: COMMERCIAL

## 2019-09-05 ENCOUNTER — HOSPITAL ENCOUNTER (OUTPATIENT)
Dept: MRI IMAGING | Age: 54
Discharge: HOME | End: 2019-09-05
Attending: FAMILY MEDICINE
Payer: COMMERCIAL

## 2019-09-05 DIAGNOSIS — Z12.39 BREAST SCREENING, UNSPECIFIED: ICD-10-CM

## 2019-09-05 DIAGNOSIS — R92.2 DENSE BREAST: ICD-10-CM

## 2019-09-05 PROCEDURE — A9585 GADOBUTROL INJECTION: HCPCS | Performed by: FAMILY MEDICINE

## 2019-09-05 PROCEDURE — 74011250636 HC RX REV CODE- 250/636: Performed by: FAMILY MEDICINE

## 2019-09-05 PROCEDURE — 77049 MRI BREAST C-+ W/CAD BI: CPT

## 2019-09-05 PROCEDURE — 74011000258 HC RX REV CODE- 258: Performed by: FAMILY MEDICINE

## 2019-09-05 PROCEDURE — 77063 BREAST TOMOSYNTHESIS BI: CPT

## 2019-09-05 RX ORDER — SODIUM CHLORIDE 0.9 % (FLUSH) 0.9 %
10 SYRINGE (ML) INJECTION
Status: COMPLETED | OUTPATIENT
Start: 2019-09-05 | End: 2019-09-05

## 2019-09-05 RX ORDER — SODIUM CHLORIDE 0.9 % (FLUSH) 0.9 %
10 SYRINGE (ML) INJECTION
OUTPATIENT
Start: 2019-09-05 | End: 2019-09-05

## 2019-09-05 RX ADMIN — SODIUM CHLORIDE 100 ML: 900 INJECTION, SOLUTION INTRAVENOUS at 13:29

## 2019-09-05 RX ADMIN — Medication 10 ML: at 13:29

## 2019-09-05 RX ADMIN — GADOBUTROL 8 ML: 604.72 INJECTION INTRAVENOUS at 13:29

## 2019-12-07 ENCOUNTER — OFFICE VISIT (OUTPATIENT)
Dept: PRIMARY CARE CLINIC | Age: 54
End: 2019-12-07

## 2019-12-07 VITALS
BODY MASS INDEX: 32.44 KG/M2 | TEMPERATURE: 98.5 F | HEIGHT: 64 IN | HEART RATE: 102 BPM | RESPIRATION RATE: 16 BRPM | OXYGEN SATURATION: 95 % | SYSTOLIC BLOOD PRESSURE: 122 MMHG | DIASTOLIC BLOOD PRESSURE: 79 MMHG | WEIGHT: 190 LBS

## 2019-12-07 DIAGNOSIS — J01.00 SUBACUTE MAXILLARY SINUSITIS: Primary | ICD-10-CM

## 2019-12-07 RX ORDER — CODEINE PHOSPHATE AND GUAIFENESIN 10; 100 MG/5ML; MG/5ML
5 SOLUTION ORAL
Qty: 90 ML | Refills: 0 | Status: SHIPPED | OUTPATIENT
Start: 2019-12-07 | End: 2019-12-14

## 2019-12-07 RX ORDER — CARVEDILOL 3.12 MG/1
TABLET ORAL
COMMUNITY
Start: 2019-12-06

## 2019-12-07 RX ORDER — AMOXICILLIN AND CLAVULANATE POTASSIUM 875; 125 MG/1; MG/1
1 TABLET, FILM COATED ORAL 2 TIMES DAILY
Qty: 20 TAB | Refills: 0 | Status: SHIPPED | OUTPATIENT
Start: 2019-12-07

## 2019-12-07 NOTE — PROGRESS NOTES
Subjective:   Molly Duncan is a 47 y.o. female who complains of congestion, sore throat, nasal blockage, night sweats, productive cough, myalgias and headache for 7 days, gradually worsening since that time. She denies a history of shortness of breath and wheezing. Evaluation to date: none. Treatment to date: OTC products. Patient does not smoke cigarettes. Relevant PMH: No pertinent additional PMH. Patient Active Problem List   Diagnosis Code    [facility] I42.9    Chronic combined systolic and diastolic heart failure--resolved I50.42    Nephrolithiasis N20.0    Essential hypertension, benign I10    Dyslipidemia E78.5    Obesity E66.9    Abnormal liver function tests R94.5    Drug therapy Z79.899     Patient Active Problem List    Diagnosis Date Noted    Abnormal liver function tests 03/14/2018    Drug therapy 03/14/2018    Obesity 05/11/2014    Essential hypertension, benign 04/17/2012    Dyslipidemia 04/17/2012    Secondary cardiomyopathy ([facility] 03/28/2012    Chronic combined systolic and diastolic heart failure--resolved 03/28/2012    Nephrolithiasis 03/28/2012     Current Outpatient Medications   Medication Sig Dispense Refill    amoxicillin-clavulanate (AUGMENTIN) 875-125 mg per tablet Take 1 Tab by mouth two (2) times a day. 20 Tab 0    guaiFENesin-codeine (GUAIATUSSIN AC) 100-10 mg/5 mL solution Take 5 mL by mouth three (3) times daily as needed for Cough for up to 7 days. Max Daily Amount: 15 mL. Take 5-10 ml at night for cough 90 mL 0    potassium chloride (K-DUR, KLOR-CON) 20 mEq tablet take 1-2 tablets by mouth twice a day (verified dose with pt) 1 Tab 0    hydroCHLOROthiazide (HYDRODIURIL) 25 mg tablet take 1 tablet by mouth once daily 30 Tab 3    losartan (COZAAR) 50 mg tablet take 1 tablet by mouth once daily 30 Tab 5    rosuvastatin (CRESTOR) 5 mg tablet Take 1 Tab by mouth nightly. 30 Tab 5    krill oil 500 mg cap Take  by mouth daily.       Mv,Ca,Iron,Min-FA-Phytosterol (CENTRUM CARDIO) 3-200-400 mg-mcg-mg Tab Take  by mouth.  aspirin 81 mg tablet Take 81 mg by mouth daily.  carvedilol (COREG) 3.125 mg tablet       UBIQUINONE PO Take  by mouth daily.  cholecalciferol, vitamin D3, 2,000 unit Tab Take  by mouth. No Known Allergies  Past Medical History:   Diagnosis Date    Edema     Essential hypertension     Essential hypertension, benign     well controlled    Mitral valve disorders(424.0)     Mixed hyperlipidemia      No past surgical history on file. Family History   Problem Relation Age of Onset    Breast Cancer Mother 52     Social History     Tobacco Use    Smoking status: Never Smoker    Smokeless tobacco: Never Used   Substance Use Topics    Alcohol use: No        Review of Systems  Pertinent items are noted in HPI. Objective:     Visit Vitals  /79 (BP 1 Location: Right arm, BP Patient Position: Sitting)   Pulse (!) 102   Temp 98.5 °F (36.9 °C) (Oral)   Resp 16   Ht 5' 4\" (1.626 m)   Wt 190 lb (86.2 kg)   LMP 06/04/2015 (Approximate)   SpO2 95%   BMI 32.61 kg/m²     General:  alert, cooperative, no distress   Eyes: negative   Ears: abnormal TM AD - bulging, abnormal TM AS - erythematous, bulging   Sinuses: tenderness over bilateral maxillary, frontal and ethmoid   Mouth:  Lips, mucosa, and tongue normal. Teeth and gums normal and abnormal findings: marked oropharyngeal erythema   Neck: supple, symmetrical, trachea midline and no adenopathy. Heart: S1 and S2 normal, no murmurs noted. Lungs: clear to auscultation bilaterally   Abdomen: soft, non-tender. Bowel sounds normal. No masses,  no organomegaly        Assessment/Plan:   sinusitis  Discussed the dx and tx of sinusitis. Suggested symptomatic OTC remedies. Antibiotics per orders. RTC prn. ICD-10-CM ICD-9-CM    1.  Subacute maxillary sinusitis J01.00 461.0 amoxicillin-clavulanate (AUGMENTIN) 875-125 mg per tablet      guaiFENesin-codeine (GUAIATUSSIN AC) 100-10 mg/5 mL solution   .

## 2019-12-07 NOTE — PATIENT INSTRUCTIONS
Sinusitis: Care Instructions Your Care Instructions Sinusitis is an infection of the lining of the sinus cavities in your head. Sinusitis often follows a cold. It causes pain and pressure in your head and face. In most cases, sinusitis gets better on its own in 1 to 2 weeks. But some mild symptoms may last for several weeks. Sometimes antibiotics are needed. Follow-up care is a key part of your treatment and safety. Be sure to make and go to all appointments, and call your doctor if you are having problems. It's also a good idea to know your test results and keep a list of the medicines you take. How can you care for yourself at home? · Take an over-the-counter pain medicine, such as acetaminophen (Tylenol), ibuprofen (Advil, Motrin), or naproxen (Aleve). Read and follow all instructions on the label. · If the doctor prescribed antibiotics, take them as directed. Do not stop taking them just because you feel better. You need to take the full course of antibiotics. · Be careful when taking over-the-counter cold or flu medicines and Tylenol at the same time. Many of these medicines have acetaminophen, which is Tylenol. Read the labels to make sure that you are not taking more than the recommended dose. Too much acetaminophen (Tylenol) can be harmful. · Breathe warm, moist air from a steamy shower, a hot bath, or a sink filled with hot water. Avoid cold, dry air. Using a humidifier in your home may help. Follow the directions for cleaning the machine. · Use saline (saltwater) nasal washes to help keep your nasal passages open and wash out mucus and bacteria. You can buy saline nose drops at a grocery store or drugstore. Or you can make your own at home by adding 1 teaspoon of salt and 1 teaspoon of baking soda to 2 cups of distilled water. If you make your own, fill a bulb syringe with the solution, insert the tip into your nostril, and squeeze gently. Ronna Marine your nose. · Put a hot, wet towel or a warm gel pack on your face 3 or 4 times a day for 5 to 10 minutes each time. · Try a decongestant nasal spray like oxymetazoline (Afrin). Do not use it for more than 3 days in a row. Using it for more than 3 days can make your congestion worse. When should you call for help? Call your doctor now or seek immediate medical care if: 
  · You have new or worse swelling or redness in your face or around your eyes.  
  · You have a new or higher fever.  
 Watch closely for changes in your health, and be sure to contact your doctor if: 
  · You have new or worse facial pain.  
  · The mucus from your nose becomes thicker (like pus) or has new blood in it.  
  · You are not getting better as expected. Where can you learn more? Go to http://matt-monica.info/. Enter Q358 in the search box to learn more about \"Sinusitis: Care Instructions. \" Current as of: October 21, 2018 Content Version: 12.2 © 3376-0615 [employer]. Care instructions adapted under license by [employer] (which disclaims liability or warranty for this information). If you have questions about a medical condition or this instruction, always ask your healthcare professional. [address] any warranty or liability for your use of this information.

## 2019-12-07 NOTE — PROGRESS NOTES
RM 5    Chief Complaint   Patient presents with    Sinus Infection     headache , sinus pressure , cough, pain in teeth x 1 week       Visit Vitals  /79 (BP 1 Location: Right arm, BP Patient Position: Sitting)   Pulse (!) 102   Temp 98.5 °F (36.9 °C) (Oral)   Resp 16   Ht 5' 4\" (1.626 m)   Wt 190 lb (86.2 kg)   SpO2 95%   BMI 32.61 kg/m²

## 2020-09-23 ENCOUNTER — HOSPITAL ENCOUNTER (OUTPATIENT)
Dept: MAMMOGRAPHY | Age: 55
Discharge: HOME | End: 2020-09-23
Attending: FAMILY MEDICINE
Payer: COMMERCIAL

## 2020-09-23 ENCOUNTER — HOSPITAL ENCOUNTER (OUTPATIENT)
Dept: MRI IMAGING | Age: 55
Discharge: HOME | End: 2020-09-23
Attending: FAMILY MEDICINE
Payer: COMMERCIAL

## 2020-09-23 DIAGNOSIS — Z12.39 BREAST CANCER SCREENING: ICD-10-CM

## 2020-09-23 DIAGNOSIS — Z12.39 BREAST SCREENING: ICD-10-CM

## 2020-09-23 PROCEDURE — A9585 GADOBUTROL INJECTION: HCPCS | Performed by: FAMILY MEDICINE

## 2020-09-23 PROCEDURE — 77049 MRI BREAST C-+ W/CAD BI: CPT

## 2020-09-23 PROCEDURE — 77063 BREAST TOMOSYNTHESIS BI: CPT

## 2020-09-23 PROCEDURE — 74011000258 HC RX REV CODE- 258: Performed by: FAMILY MEDICINE

## 2020-09-23 PROCEDURE — 74011250636 HC RX REV CODE- 250/636: Performed by: FAMILY MEDICINE

## 2020-09-23 RX ORDER — SODIUM CHLORIDE 0.9 % (FLUSH) 0.9 %
10 SYRINGE (ML) INJECTION
Status: COMPLETED | OUTPATIENT
Start: 2020-09-23 | End: 2020-09-23

## 2020-09-23 RX ADMIN — SODIUM CHLORIDE 100 ML: 900 INJECTION, SOLUTION INTRAVENOUS at 09:54

## 2020-09-23 RX ADMIN — Medication 10 ML: at 09:54

## 2020-09-23 RX ADMIN — GADOBUTROL 9 ML: 604.72 INJECTION INTRAVENOUS at 09:54

## 2021-01-12 ENCOUNTER — APPOINTMENT (OUTPATIENT)
Dept: CT IMAGING | Age: 56
End: 2021-01-12
Attending: EMERGENCY MEDICINE
Payer: COMMERCIAL

## 2021-01-12 ENCOUNTER — APPOINTMENT (OUTPATIENT)
Dept: MRI IMAGING | Age: 56
End: 2021-01-12
Attending: PSYCHIATRY & NEUROLOGY
Payer: COMMERCIAL

## 2021-01-12 ENCOUNTER — HOSPITAL ENCOUNTER (EMERGENCY)
Age: 56
Discharge: HOME | End: 2021-01-12
Attending: EMERGENCY MEDICINE
Payer: COMMERCIAL

## 2021-01-12 ENCOUNTER — APPOINTMENT (OUTPATIENT)
Dept: GENERAL RADIOLOGY | Age: 56
End: 2021-01-12
Attending: EMERGENCY MEDICINE
Payer: COMMERCIAL

## 2021-01-12 VITALS
HEART RATE: 82 BPM | OXYGEN SATURATION: 94 % | DIASTOLIC BLOOD PRESSURE: 67 MMHG | TEMPERATURE: 97.7 F | RESPIRATION RATE: 20 BRPM | SYSTOLIC BLOOD PRESSURE: 134 MMHG

## 2021-01-12 DIAGNOSIS — R42 DIZZINESS: Primary | ICD-10-CM

## 2021-01-12 DIAGNOSIS — Z86.79 HISTORY OF HYPERTENSION: ICD-10-CM

## 2021-01-12 LAB
ALBUMIN SERPL-MCNC: 3.8 G/DL (ref 3.5–5)
ALBUMIN/GLOB SERPL: 0.9 {RATIO} (ref 1.1–2.2)
ALP SERPL-CCNC: 70 U/L (ref 45–117)
ALT SERPL-CCNC: 27 U/L (ref 12–78)
ANION GAP SERPL CALC-SCNC: 4 MMOL/L (ref 5–15)
AST SERPL-CCNC: 17 U/L (ref 15–37)
ATRIAL RATE: 77 BPM
BASOPHILS # BLD: 0.1 K/UL (ref 0–0.1)
BASOPHILS NFR BLD: 1 % (ref 0–1)
BILIRUB SERPL-MCNC: 0.6 MG/DL (ref 0.2–1)
BUN SERPL-MCNC: 25 MG/DL (ref 6–20)
BUN/CREAT SERPL: 31 (ref 12–20)
CALCIUM SERPL-MCNC: 8.9 MG/DL (ref 8.5–10.1)
CALCULATED P AXIS, ECG09: 50 DEGREES
CALCULATED R AXIS, ECG10: 1 DEGREES
CALCULATED T AXIS, ECG11: 11 DEGREES
CHLORIDE SERPL-SCNC: 102 MMOL/L (ref 97–108)
CO2 SERPL-SCNC: 32 MMOL/L (ref 21–32)
CREAT SERPL-MCNC: 0.81 MG/DL (ref 0.55–1.02)
DIAGNOSIS, 93000: NORMAL
DIFFERENTIAL METHOD BLD: NORMAL
EOSINOPHIL # BLD: 0.2 K/UL (ref 0–0.4)
EOSINOPHIL NFR BLD: 3 % (ref 0–7)
ERYTHROCYTE [DISTWIDTH] IN BLOOD BY AUTOMATED COUNT: 14 % (ref 11.5–14.5)
EST. AVERAGE GLUCOSE BLD GHB EST-MCNC: 105 MG/DL
GLOBULIN SER CALC-MCNC: 4.1 G/DL (ref 2–4)
GLUCOSE BLD STRIP.AUTO-MCNC: 108 MG/DL (ref 65–100)
GLUCOSE SERPL-MCNC: 104 MG/DL (ref 65–100)
HBA1C MFR BLD: 5.3 % (ref 4–5.6)
HCT VFR BLD AUTO: 38.8 % (ref 35–47)
HGB BLD-MCNC: 12.6 G/DL (ref 11.5–16)
IMM GRANULOCYTES # BLD AUTO: 0 K/UL (ref 0–0.04)
IMM GRANULOCYTES NFR BLD AUTO: 0 % (ref 0–0.5)
INR PPP: 1 (ref 0.9–1.1)
LYMPHOCYTES # BLD: 1.7 K/UL (ref 0.8–3.5)
LYMPHOCYTES NFR BLD: 26 % (ref 12–49)
MCH RBC QN AUTO: 29.6 PG (ref 26–34)
MCHC RBC AUTO-ENTMCNC: 32.5 G/DL (ref 30–36.5)
MCV RBC AUTO: 91.1 FL (ref 80–99)
MONOCYTES # BLD: 0.5 K/UL (ref 0–1)
MONOCYTES NFR BLD: 8 % (ref 5–13)
NEUTS SEG # BLD: 4.1 K/UL (ref 1.8–8)
NEUTS SEG NFR BLD: 62 % (ref 32–75)
NRBC # BLD: 0 K/UL (ref 0–0.01)
NRBC BLD-RTO: 0 PER 100 WBC
P-R INTERVAL, ECG05: 178 MS
PLATELET # BLD AUTO: 276 K/UL (ref 150–400)
PMV BLD AUTO: 9.2 FL (ref 8.9–12.9)
POTASSIUM SERPL-SCNC: 3.5 MMOL/L (ref 3.5–5.1)
PROT SERPL-MCNC: 7.9 G/DL (ref 6.4–8.2)
PROTHROMBIN TIME: 10.4 SEC (ref 9–11.1)
Q-T INTERVAL, ECG07: 384 MS
QRS DURATION, ECG06: 92 MS
QTC CALCULATION (BEZET), ECG08: 434 MS
RBC # BLD AUTO: 4.26 M/UL (ref 3.8–5.2)
SERVICE CMNT-IMP: ABNORMAL
SODIUM SERPL-SCNC: 138 MMOL/L (ref 136–145)
TROPONIN I SERPL-MCNC: <0.05 NG/ML
VENTRICULAR RATE, ECG03: 77 BPM
WBC # BLD AUTO: 6.6 K/UL (ref 3.6–11)

## 2021-01-12 PROCEDURE — 70450 CT HEAD/BRAIN W/O DYE: CPT

## 2021-01-12 PROCEDURE — 82962 GLUCOSE BLOOD TEST: CPT

## 2021-01-12 PROCEDURE — 85025 COMPLETE CBC W/AUTO DIFF WBC: CPT

## 2021-01-12 PROCEDURE — 85610 PROTHROMBIN TIME: CPT

## 2021-01-12 PROCEDURE — 93005 ELECTROCARDIOGRAM TRACING: CPT

## 2021-01-12 PROCEDURE — 80053 COMPREHEN METABOLIC PANEL: CPT

## 2021-01-12 PROCEDURE — 84484 ASSAY OF TROPONIN QUANT: CPT

## 2021-01-12 PROCEDURE — 99285 EMERGENCY DEPT VISIT HI MDM: CPT

## 2021-01-12 PROCEDURE — 70551 MRI BRAIN STEM W/O DYE: CPT

## 2021-01-12 PROCEDURE — 99205 OFFICE O/P NEW HI 60 MIN: CPT | Performed by: PSYCHIATRY & NEUROLOGY

## 2021-01-12 PROCEDURE — 74011250636 HC RX REV CODE- 250/636: Performed by: EMERGENCY MEDICINE

## 2021-01-12 PROCEDURE — 74011000636 HC RX REV CODE- 636: Performed by: EMERGENCY MEDICINE

## 2021-01-12 PROCEDURE — 74011250637 HC RX REV CODE- 250/637: Performed by: EMERGENCY MEDICINE

## 2021-01-12 PROCEDURE — 36415 COLL VENOUS BLD VENIPUNCTURE: CPT

## 2021-01-12 PROCEDURE — 83036 HEMOGLOBIN GLYCOSYLATED A1C: CPT

## 2021-01-12 PROCEDURE — 70496 CT ANGIOGRAPHY HEAD: CPT

## 2021-01-12 PROCEDURE — 71045 X-RAY EXAM CHEST 1 VIEW: CPT

## 2021-01-12 RX ORDER — MECLIZINE HCL 12.5 MG 12.5 MG/1
25 TABLET ORAL
Status: COMPLETED | OUTPATIENT
Start: 2021-01-12 | End: 2021-01-12

## 2021-01-12 RX ORDER — MECLIZINE HYDROCHLORIDE 25 MG/1
25 TABLET ORAL
Qty: 10 TAB | Refills: 0 | Status: SHIPPED | OUTPATIENT
Start: 2021-01-12

## 2021-01-12 RX ORDER — ASPIRIN 325 MG
325 TABLET ORAL ONCE
Status: COMPLETED | OUTPATIENT
Start: 2021-01-12 | End: 2021-01-12

## 2021-01-12 RX ADMIN — ASPIRIN 325 MG: 325 TABLET, FILM COATED ORAL at 12:24

## 2021-01-12 RX ADMIN — MECLIZINE 12.5 MG: 12.5 TABLET ORAL at 09:22

## 2021-01-12 RX ADMIN — IOPAMIDOL 100 ML: 755 INJECTION, SOLUTION INTRAVENOUS at 10:07

## 2021-01-12 RX ADMIN — SODIUM CHLORIDE 1000 ML: 9 INJECTION, SOLUTION INTRAVENOUS at 09:24

## 2021-01-12 NOTE — CONSULTS
Date of Consultation:  January 12, 2021    Referring Physician: Prashanth Burk MD     Reason for Consultation:  ? Stroke     Chief Complaint   Patient presents with    Dizziness     onset this am while driving to work- has taken am meds- while at work-\" felt as if had  etoh but have not- \"    Headache     points to left posterior head        History of Present Illness:   Mishel Olmedo is a 54 y.o. female with a history of hypertension and hyperlipidemia who presents to the ED with complaints of gait imbalance and lightheadedness. Patient reports that she was at her baseline state of health and came to work at approximately 630 this morning. Shortly after around 715 she started feeling a little lightheaded as well as had some trouble with walking. She reports she felt as if she was drunk however did not drink anything. Her blood pressure was checked by a coworker and it was noted to be elevated at 160/90. Given the symptoms patient was brought to the emergency room for further evaluation. However she went underwent a noncontrast head CT that showed no acute abnormalities. Patient reports that she has been under a significant amount of stress recently and has not checked her blood pressure in the past few weeks. She reports that generally runs with systolics of 446D. Patient is concerned that she may be having a stroke as she recently tested positive for Covid antibodies is concerned about the hypercoagulability associated with Covid. Past Medical History:   Diagnosis Date    Edema     Essential hypertension     Essential hypertension, benign     well controlled    Mitral valve disorders(424.0)     Mixed hyperlipidemia         No past surgical history on file.      Family History   Problem Relation Age of Onset    Breast Cancer Mother 52        Social History     Tobacco Use    Smoking status: Never Smoker    Smokeless tobacco: Never Used   Substance Use Topics    Alcohol use: No        No Known Allergies     Prior to Admission medications    Medication Sig Start Date End Date Taking? Authorizing Provider   carvedilol (COREG) 3.125 mg tablet  12/6/19   Provider, Historical   amoxicillin-clavulanate (AUGMENTIN) 875-125 mg per tablet Take 1 Tab by mouth two (2) times a day. 12/7/19   Curt Aase, NP   potassium chloride (K-DUR, KLOR-CON) 20 mEq tablet take 1-2 tablets by mouth twice a day (verified dose with pt) 2/2/19   Lord Kyra MD   hydroCHLOROthiazide (HYDRODIURIL) 25 mg tablet take 1 tablet by mouth once daily 8/26/18   Lord Kyra MD   losartan (COZAAR) 50 mg tablet take 1 tablet by mouth once daily 7/26/18   Lord Kyra MD   rosuvastatin (CRESTOR) 5 mg tablet Take 1 Tab by mouth nightly. 7/26/18   Lord Kyra MD   krill oil 500 mg cap Take  by mouth daily. Provider, Historical   UBIQUINONE PO Take  by mouth daily. Provider, Historical   Mv,Ca,Iron,Min-FA-Phytosterol (CENTRUM CARDIO) 3-200-400 mg-mcg-mg Tab Take  by mouth. Provider, Historical   cholecalciferol, vitamin D3, 2,000 unit Tab Take  by mouth. Provider, Historical   aspirin 81 mg tablet Take 81 mg by mouth daily.     Provider, Historical       Review of Systems:    General, constitutional: negative  Eyes, vision: negative  Ears, nose, throat: negative  Cardiovascular, heart: negative  Respiratory: negative  Gastrointestinal: negative  Genitourinary: negative  Musculoskeletal: negative  Skin and integumentary: negative  Psychiatric: negative  Endocrine: negative  Neurological: negative, except for HPI  Hematologic/lymphatic: negative  Allergy/immunology: negative      PHYSICAL EXAMINATION:   Visit Vitals  /67   Pulse 82   Temp 97.7 °F (36.5 °C)   Resp 20   LMP 06/04/2015 (Approximate)   SpO2 94%       Physical Exam:  General:  no acute distress  Neck: no carotid bruits  Lungs: clear to auscultation  Heart:  no murmurs, regular rate and rhythm   Lower extremity: no edema    Neurological exam:  Mental Status: Awake, alert, oriented to person, place and time  Attention and Concentration: able to state the days of the week backwards   Speech and Language: No dysarthria. Able to name, repeat and follow commands   Fund of knowledge was preserved    Cranial nerves: II-XII  Pupils equal and reactive, visual fields intact by confrontation   Extraocular movements intact, no evidence of nystagmus or ptosis   Facial sensation intact   Facial movements symmetric   Hearing intact to soft rub bilaterally   Shoulder shrug symmetric and strong   Tongue protrusion full and midline without fasciculation or atrophy    Motor:   Normal tone and Bulk   Drift: No evidence of pronator drift     Strength testing:   deltoid triceps biceps Wrist ext. Wrist flex. intrinsics   Right 5 5 5 5 5 5   Left 5 5 5 5 5 5      Hip flex. Hip ext. Knee ext. Knee flex Dorsi flex Plantar flex   Right  5 5 5 5 5 5   Left  5 5 5 5 5 5       Sensory:  Upper extremity: intact to pinprick, light touch, and vibration > 10 seconds  Lower extremity: intact to pinprick, light touch, and vibration > 10 seconds    Reflexes:   Biceps Triceps  Brachiorad Patellar Achilles Plantar Hoffmans   Right  2 2 2 2 2 Down Neg   Left  2 2 2 2 2 Down Neg      Cerebellar testing:  No dysmetria. Finger-nose-finger was intact. Romberg: absent    Gait: steady she was seen walking stretcher to the CT scanner.     LAB DATA REVIEWED:    Lab Results   Component Value Date/Time    Sodium 138 01/12/2021 09:11 AM    Potassium 3.5 01/12/2021 09:11 AM    Chloride 102 01/12/2021 09:11 AM    Glucose 104 (H) 01/12/2021 09:11 AM    BUN 25 (H) 01/12/2021 09:11 AM    Creatinine 0.81 01/12/2021 09:11 AM    Calcium 8.9 01/12/2021 09:11 AM    WBC 6.6 01/12/2021 09:11 AM    HCT 38.8 01/12/2021 09:11 AM    HGB 12.6 01/12/2021 09:11 AM    PLATELET 107 40/39/1529 09:11 AM       Stroke workup    MRI Brain  Review of the MRI of the brain revealed no evidence of diffusion restriction. There also was no evidence of T2 hyperintensities in the periventricular region. CTA head  Independently reviewed and showed no large vessel occlusion. There was some evidence of mild atherosclerotic disease at the carotid bifurcations left greater than right and at the carotid siphons again left greater than right. Non Contrast head CT was independently reviewed and showed no evidence of acute hemorrhage. TTE:   Pending    Stroke labs:    HgBA1c    No results found for: HBA1C, VRS9FFSS    LDL   Lab Results   Component Value Date/Time    LDL, calculated 128 (H) 01/25/2018 07:43 AM       EKG: Normal sinus rhythm    Assessment and Plan:   Kyra Rodas is a 54 y.o. female with a history of hypertension and hyperlipidemia who presents to the ED with complaints of gait imbalance and lightheadedness. MRI of the brain did not reveal evidence of a stroke. Etiology of her symptoms are more likely due to elevated blood pressure then vascular in nature. Gait imbalance and dizziness: Possibly due to elevated blood pressure and orthostasis. MRI of the brain did not reveal any evidence of a stroke. - Recommend maintaining SBP goal is less than 140/90 (this is the long term goal)   - would recommend to continue 81mg daily for secondary stroke prevention   - Risk factor modification: would recommend obtaining lipid panel and A1c    - if LDL > 70, would start atorvastatin 40mg daily   -As MRI did not show any evidence of a stroke, can defer getting an echocardiogram at this time. - would monitor on telemetry to rule out arrhythmias    - please obtain PT/OT and speech consultations     Hypertension: Appears to be uncontrolled  -We will need titration of her antihypertensives and needs close follow-up with her PCP. - Blood pressure goal should be less than 140/90.     We discussed extensively the importance of lifestyle modification including smoking cessation, diet, and incorporating exercise into daily routine.    Thank you for the consult, will sign off at this time.  Please call with any additional questions.      Lizeth Avendano MD

## 2021-01-12 NOTE — ED PROVIDER NOTES
EMERGENCY DEPARTMENT HISTORY AND PHYSICAL EXAM      Date: 1/12/2021  Patient Name: Riky Gutierrez    History of Presenting Illness     Chief Complaint   Patient presents with    Dizziness     onset this am while driving to work- has taken am meds- while at work-\" felt as if had  etoh but have not- \"    Headache     points to left posterior head        History Provided By: Patient    HPI: Riky Gutierrez, 54 y.o. 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 She denies any dizziness or spinning, any head trauma, neck pain, chest pain, shortness of breath or other complaint. She discussed her symptoms which started approximately 715 this morning while driving to work with the neurologist on the floor who advised her to come to the ED for further evaluation. She herself does not have a history of stroke but relays a family history of brain aneurysm in her mother. She is concerned that she is having a posterior circulation stroke. There are no other complaints, changes, or physical findings at this time. PCP: Anabela Rodriguez MD    No current facility-administered medications on file prior to encounter. Current Outpatient Medications on File Prior to Encounter   Medication Sig Dispense Refill    carvedilol (COREG) 3.125 mg tablet       amoxicillin-clavulanate (AUGMENTIN) 875-125 mg per tablet Take 1 Tab by mouth two (2) times a day.  20 Tab 0    potassium chloride (K-DUR, KLOR-CON) 20 mEq tablet take 1-2 tablets by mouth twice a day (verified dose with pt) 1 Tab 0    hydroCHLOROthiazide (HYDRODIURIL) 25 mg tablet take 1 tablet by mouth once daily 30 Tab 3    losartan (COZAAR) 50 mg tablet take 1 tablet by mouth once daily 30 Tab 5  rosuvastatin (CRESTOR) 5 mg tablet Take 1 Tab by mouth nightly. 30 Tab 5    krill oil 500 mg cap Take  by mouth daily.  UBIQUINONE PO Take  by mouth daily.  Mv,Ca,Iron,Min-FA-Phytosterol (CENTRUM CARDIO) 3-200-400 mg-mcg-mg Tab Take  by mouth.  cholecalciferol, vitamin D3, 2,000 unit Tab Take  by mouth.  aspirin 81 mg tablet Take 81 mg by mouth daily. Past History     Past Medical History:  Past Medical History:   Diagnosis Date    Edema     Essential hypertension     Essential hypertension, benign     well controlled    Mitral valve disorders(424.0)     Mixed hyperlipidemia        Past Surgical History:  No past surgical history on file. Family History:  Family History   Problem Relation Age of Onset   Genaro Royal Breast Cancer Mother 52       Social History:  Social History     Tobacco Use    Smoking status: Never Smoker    Smokeless tobacco: Never Used   Substance Use Topics    Alcohol use: No    Drug use: Not on file       Allergies:  No Known Allergies      Review of Systems   Review of Systems   Constitutional: Negative for activity change and appetite change. HENT: Negative for congestion. Eyes: Negative for visual disturbance. Respiratory: Negative. Gastrointestinal: Negative. Genitourinary: Negative. Neurological: Positive for light-headedness and headaches. Negative for dizziness, tremors, seizures, syncope, facial asymmetry, speech difficulty, weakness and numbness. All other systems reviewed and are negative. Physical Exam   Physical Exam   Vital signs and nursing notes reviewed    CONSTITUTIONAL: Alert, in mild distress; well-developed; well-nourished. Accompanied by a nurse colleague. HEAD:  Normocephalic, atraumatic  EYES: PERRL; EOM's intact. ENTM: Nose: no rhinorrhea; Throat: no erythema or exudate, mucous membranes moist, no nystagmus on horizontal gaze testing. Neck:  Supple. trachea is midline.   Full range of motion of the neck.  RESP: Chest clear, equal breath sounds. - W/R/R  CV: S1 and S2 WNL; No murmurs, gallops or rubs. 2+ radial and DP pulses bilaterally. GI: non-distended, normal bowel sounds, abdomen soft and non-tender. No masses or organomegaly. : No costo-vertebral angle tenderness. BACK:  Non-tender, normal appearance  UPPER EXT:  Normal inspection. no joint or soft tissue swelling  LOWER EXT: No edema, no calf tenderness. NEURO: Alert and oriented x3, 5/5 strength and light touch sensation intact in bilateral upper and lower extremities. Answers questions correctly, performs task correctly, no visual field deficit, good object identification and descriptions. Good finger-to-nose and heel-to-shin bilaterally. No neglect, no dysarthria. SKIN: No rashes; Warm and dry  PSYCH: Normal mood, normal affect    Diagnostic Study Results     Labs -     Recent Results (from the past 12 hour(s))   GLUCOSE, POC    Collection Time: 01/12/21  8:56 AM   Result Value Ref Range    Glucose (POC) 108 (H) 65 - 100 mg/dL    Performed by Eddie Sanford    CBC WITH AUTOMATED DIFF    Collection Time: 01/12/21  9:11 AM   Result Value Ref Range    WBC 6.6 3.6 - 11.0 K/uL    RBC 4.26 3.80 - 5.20 M/uL    HGB 12.6 11.5 - 16.0 g/dL    HCT 38.8 35.0 - 47.0 %    MCV 91.1 80.0 - 99.0 FL    MCH 29.6 26.0 - 34.0 PG    MCHC 32.5 30.0 - 36.5 g/dL    RDW 14.0 11.5 - 14.5 %    PLATELET 670 931 - 741 K/uL    MPV 9.2 8.9 - 12.9 FL    NRBC 0.0 0  WBC    ABSOLUTE NRBC 0.00 0.00 - 0.01 K/uL    NEUTROPHILS 62 32 - 75 %    LYMPHOCYTES 26 12 - 49 %    MONOCYTES 8 5 - 13 %    EOSINOPHILS 3 0 - 7 %    BASOPHILS 1 0 - 1 %    IMMATURE GRANULOCYTES 0 0.0 - 0.5 %    ABS. NEUTROPHILS 4.1 1.8 - 8.0 K/UL    ABS. LYMPHOCYTES 1.7 0.8 - 3.5 K/UL    ABS. MONOCYTES 0.5 0.0 - 1.0 K/UL    ABS. EOSINOPHILS 0.2 0.0 - 0.4 K/UL    ABS. BASOPHILS 0.1 0.0 - 0.1 K/UL    ABS. IMM.  GRANS. 0.0 0.00 - 0.04 K/UL    DF AUTOMATED     METABOLIC PANEL, COMPREHENSIVE    Collection Time: 01/12/21  9:11 AM   Result Value Ref Range    Sodium 138 136 - 145 mmol/L    Potassium 3.5 3.5 - 5.1 mmol/L    Chloride 102 97 - 108 mmol/L    CO2 32 21 - 32 mmol/L    Anion gap 4 (L) 5 - 15 mmol/L    Glucose 104 (H) 65 - 100 mg/dL    BUN 25 (H) 6 - 20 MG/DL    Creatinine 0.81 0.55 - 1.02 MG/DL    BUN/Creatinine ratio 31 (H) 12 - 20      GFR est AA >60 >60 ml/min/1.73m2    GFR est non-AA >60 >60 ml/min/1.73m2    Calcium 8.9 8.5 - 10.1 MG/DL    Bilirubin, total 0.6 0.2 - 1.0 MG/DL    ALT (SGPT) 27 12 - 78 U/L    AST (SGOT) 17 15 - 37 U/L    Alk. phosphatase 70 45 - 117 U/L    Protein, total 7.9 6.4 - 8.2 g/dL    Albumin 3.8 3.5 - 5.0 g/dL    Globulin 4.1 (H) 2.0 - 4.0 g/dL    A-G Ratio 0.9 (L) 1.1 - 2.2     PROTHROMBIN TIME + INR    Collection Time: 01/12/21  9:11 AM   Result Value Ref Range    INR 1.0 0.9 - 1.1      Prothrombin time 10.4 9.0 - 11.1 sec   TROPONIN I    Collection Time: 01/12/21  9:11 AM   Result Value Ref Range    Troponin-I, Qt. <0.05 <0.05 ng/mL   HEMOGLOBIN A1C WITH EAG    Collection Time: 01/12/21  9:11 AM   Result Value Ref Range    Hemoglobin A1c 5.3 4.0 - 5.6 %    Est. average glucose 105 mg/dL   EKG, 12 LEAD, INITIAL    Collection Time: 01/12/21 11:20 AM   Result Value Ref Range    Ventricular Rate 77 BPM    Atrial Rate 77 BPM    P-R Interval 178 ms    QRS Duration 92 ms    Q-T Interval 384 ms    QTC Calculation (Bezet) 434 ms    Calculated P Axis 50 degrees    Calculated R Axis 1 degrees    Calculated T Axis 11 degrees    Diagnosis       Normal sinus rhythm  Normal ECG  Confirmed by Katie Briggs M.D. ([id number]) on 1/12/2021 1:00:54 PM         Radiologic Studies -   MRI BRAIN WO CONT   Final Result   IMPRESSION:   No significant abnormality or acute process. CTA HEAD NECK W CONT   Final Result   IMPRESSION:      No acute process. No large vessel occlusion, arterial dissection, or   flow-limiting stenosis. XR CHEST PORT   Final Result   Impression: No acute process.          CT CODE NEURO HEAD WO CONTRAST   Final Result   IMPRESSION:    No acute abnormality. CT Results  (Last 48 hours)               01/12/21 1007  CTA HEAD NECK W CONT Final result    Impression:  IMPRESSION:       No acute process. No large vessel occlusion, arterial dissection, or   flow-limiting stenosis. Narrative:  INDICATION: recent lightheadedness, hx of covid, hx of family aneurysm, eval for   thrombosis/aneurysm       EXAMINATION:  CT ANGIOGRAPHY HEAD AND NECK        COMPARISON: CT head earlier today       TECHNIQUE:  Following the uneventful administration of iodinated contrast   material, axial CT angiography of the head and neck was performed. Delayed axial   images through the head were also obtained. Coronal and sagittal reconstructions   were obtained. Manual postprocessing of images was performed. 3-D  Sagittal   maximal intensity projection images were obtained. 3-D Coronal maximal   intensity projections were obtained. CT dose reduction was achieved through use   of a standardized protocol tailored for this examination and automatic exposure   control for dose modulation. FINDINGS:       CTA NECK:       Aortic Arch: No significant abnormality. Right Common Carotid Artery: No significant abnormality. Right Internal Carotid Artery: Minimal calcific plaque at the origin of the   right internal carotid artery without stenosis. NASCET Right: 0-25%. Left Common Carotid Artery: No significant abnormality. Left Internal Carotid Artery: No significant abnormality. NASCET Left: 0-25%. Carotid stenosis determined using NASCET criteria. Right Vertebral Artery: No significant abnormality. Left Vertebral Artery: No significant abnormality. Cervical Soft Tissues: No significant abnormality. Lung Apices: No significant abnormality. Bones: No destructive bone lesion.    Additional Comments: N/A.       CTA HEAD:       Posterior Circulation: No flow limiting stenosis or occlusion. Anterior Circulation: Minimal calcific plaque bilateral cavernous internal   carotid artery segments. No flow limiting stenosis or branch occlusion. Middle   and anterior cerebral arteries are patent. Additional Comments: No evidence of aneurysm or vascular malformation. 01/12/21 0903  CT CODE NEURO HEAD WO CONTRAST Final result    Impression:  IMPRESSION:    No acute abnormality. Narrative:  EXAM: CT CODE NEURO HEAD WO CONTRAST       INDICATION: Dizziness, headache       COMPARISON: None. CONTRAST: None. TECHNIQUE: Unenhanced CT of the head was performed using 5 mm images. Brain and   bone windows were generated. Coronal and sagittal reformats. CT dose reduction   was achieved through use of a standardized protocol tailored for this   examination and automatic exposure control for dose modulation. FINDINGS:   The ventricles and sulci are normal in size, shape and configuration. . There is   no significant white matter disease. There is no intracranial hemorrhage,   extra-axial collection, or mass effect. The basilar cisterns are open. No CT   evidence of acute infarct. The bone windows demonstrate no abnormalities. The visualized portions of the   paranasal sinuses and mastoid air cells are clear. CXR Results  (Last 48 hours)               01/12/21 0945  XR CHEST PORT Final result    Impression:  Impression: No acute process. Narrative: Indication: Dizziness       Comparison: None       Portable exam of the chest obtained at 938 demonstrates normal heart size. There   is no acute process in the lung fields. The osseous structures are unremarkable. Medical Decision Making   I am the first provider for this patient. I reviewed the vital signs, available nursing notes, past medical history, past surgical history, family history and social history. Vital Signs-Reviewed the patient's vital signs.   Patient Vitals for the past 12 hrs:   Temp Pulse Resp BP SpO2   01/12/21 1030  82 20 134/67 94 %   01/12/21 1000  76 19 (!) 140/76 (!) 71 %   01/12/21 0945  84 20 (!) 150/86 91 %   01/12/21 0930  82 24 (!) 151/85 100 %   01/12/21 0846     100 %   01/12/21 0845  98 15 (!) 171/89 99 %   01/12/21 0830  76 16 (!) 154/98 100 %   01/12/21 0824 97.7 °F (36.5 °C) 81 18 (!) 155/88 100 %       EKG interpretation: (Preliminary)  EKG performed at 11:20 AM, as interpreted by me shows normal sinus rhythm at a rate of 77, normal axis, normal intervals, no visible acute ischemic changes. Records Reviewed: Nursing Notes and Old Medical Records    Provider Notes (Medical Decision Making):   59-year-old female with acute onset of symptoms with nonfocal neurological exam but still some mild concern for possible central cause. Differential includes peripheral vertigo, CVA, thrombosis. Of note, patient has had recent positive Covid antibody test and is concerned and is concerned about thrombosis. Thankfully, CT, CTA and MRI are all reassuring for no evidence of stroke or vessel occlusion or aneurysm. Patient feeling better during the course of her ED stay, improved blood pressure without intervention. Will discharge with meclizine. Patient's radiographic course was disrupted with having both the telemetry neurologist and neurologist seeing the patient at the same time. Ultimately, patient did not want TPA until an MRI confirmed a stroke and there was no stroke on MRI. Plan for discharge home. ED Course:   Initial assessment performed. The patients presenting problems have been discussed, and they are in agreement with the care plan formulated and outlined with them. I have encouraged them to ask questions as they arise throughout their visit. ED Course as of Jan 12 1554   Tue Jan 12, 2021   8211 Spoke with Dr. Monica Tarango by phone, to see patient after CT w/o contract of brain.     [TL]   21  with neurologist on the floor, Dr. Lashawn Faustin, who advised patient to go to the ED.,  Provided brief update. Patient currently in the CAT scan. [TL]   0915 Patient seen by teleneurologist.  Telemetry neurologist leaning towards TPA but patient wants an MRI before she would get the TPA. Dr. Lashawn Faustin here and wants to discuss with patient potentially getting a CTA instead of an MRI.    [TL]      ED Course User Index  [TL] Merribeth Lombard, MD             Critical Care Time:   CRITICAL CARE NOTE :    8:30 AM    IMPENDING DETERIORATION -CNS  ASSOCIATED RISK FACTORS - CNS Decompensation  MANAGEMENT- Bedside Assessment and Supervision of Care  INTERPRETATION -  CT Scan and Blood Pressure  INTERVENTIONS - hemodynamic mngmt and Neurologic interventions during evaluation for TPA. CASE REVIEW - Medical Sub-Specialist, Nursing and Family  TREATMENT RESPONSE -Stable  PERFORMED BY - Self    NOTES   :  I have spent 55 minutes of critical care time involved in lab review, consultations with specialist, family decision- making, bedside attention and documentation. This time excludes time spent in any separate billed procedures. During this entire length of time I was immediately available to the patient . Moira Coronado MD  12:20 PM  Patient feeling better, reviewed all findings with patient, prescription for meclizine as well as return precautions. Plan for discharge. Disposition:  Discharge    Discharge Note:  12:21 PM  The pt is ready for discharge. The pt's signs, symptoms, diagnosis, and discharge instructions have been discussed and pt has conveyed their understanding. The pt is to follow up as recommended or return to ER should their symptoms worsen. Plan has been discussed and pt is in agreement. DISCHARGE PLAN:  1. Discharge Medication List as of 1/12/2021 12:20 PM      START taking these medications    Details   meclizine (ANTIVERT) 25 mg tablet Take 1 Tab by mouth three (3) times daily as needed for Dizziness for up to 10 doses. Indications: sensation of spinning or whirling, Normal, Disp-10 Tab, R-0         CONTINUE these medications which have NOT CHANGED    Details   carvedilol (COREG) 3.125 mg tablet Historical Med      amoxicillin-clavulanate (AUGMENTIN) 875-125 mg per tablet Take 1 Tab by mouth two (2) times a day., Normal, Disp-20 Tab, R-0      potassium chloride (K-DUR, KLOR-CON) 20 mEq tablet take 1-2 tablets by mouth twice a day (verified dose with pt), NormalRefused. No longer followed by usDisp-1 Tab, R-0, NYASIA      hydroCHLOROthiazide (HYDRODIURIL) 25 mg tablet take 1 tablet by mouth once daily, Normal, Disp-30 Tab, R-3      losartan (COZAAR) 50 mg tablet take 1 tablet by mouth once daily, Phone InMay give genericDisp-30 Tab, R-5      rosuvastatin (CRESTOR) 5 mg tablet Take 1 Tab by mouth nightly. , Phone InDose increased to 5 mg qhs on 1-31-18. May give generics. Disp-30 Tab, R-5      krill oil 500 mg cap Take  by mouth daily. , Historical Med      UBIQUINONE PO Take  by mouth daily. , Historical Med      Mv,Ca,Iron,Min-FA-Phytosterol (CENTRUM CARDIO) 3-200-400 mg-mcg-mg Tab Take  by mouth.  , Historical Med      cholecalciferol, vitamin D3, 2,000 unit Tab Take  by mouth.  , Historical Med      aspirin 81 mg tablet Take 81 mg by mouth daily. , Historical Med           2. Follow-up Information     Follow up With Specialties Details Why Contact Info    Philip White MD Family Medicine In 2 days For reevaluation of your symptoms. [address] [place] [address]  [phone]      [facility] EMERGENCY DEPT Emergency Medicine  If symptoms worsen including new onset weakness in your face arms or legs, difficulty with speech or other new concerning symptoms. [address] [place], [place] [address]way  [phone]        3. Return to ED if worse     Diagnosis     Clinical Impression:   1. Dizziness    2.  History of hypertension        Attestations:    Seema Herbert MD    Please note that this dictation was completed with Dragon, the computer voice recognition software. Quite often unanticipated grammatical, syntax, homophones, and other interpretive errors are inadvertently transcribed by the computer software. Please disregard these errors. Please excuse any errors that have escaped final proofreading. Thank you.

## 2021-01-12 NOTE — ED NOTES
Pt comes in with complaint of feeling a little \"off kilter\", dizzy but no room spinning. Her BP was elevated this morning. Symptoms began at 0715 this morning. She now complains of left-sided headache and neck pain x the last half an hour. Earlier this morning, sx were worse if she turned her head, but that is resolving.   Pt works on the neuro floor, and she consulted with one of the neurologists, who advised evaluation in the ER

## 2021-01-12 NOTE — PROGRESS NOTES
Spiritual Care Assessment/Progress Note  [facility]      NAME: Karlee Calzada      MRN: [id number]  AGE: 54 y.o. SEX: female  Restoration Affiliation: Buddhist   Language: English     1/12/2021     Total Time (in minutes): 17     Spiritual Assessment begun in [facility] EMERGENCY DEPT through conversation with:         [x]Patient        [x] Family    [] Friend(s)        Reason for Consult: Crisis     Spiritual beliefs: (Please include comment if needed)     [x] Identifies with a jeanne tradition:         [x] Supported by a jeanne community:            [] Claims no spiritual orientation:           [] Seeking spiritual identity:                [] Adheres to an individual form of spirituality:           [] Not able to assess:                           Identified resources for coping:      [x] Prayer                               [] Music                  [] Guided Imagery     [x] Family/friends                 [] Pet visits     [] Devotional reading                         [] Unknown     [x] Other: Keeping busy                                             Interventions offered during this visit: (See comments for more details)    Patient Interventions: Affirmation of emotions/emotional suffering, Coping skills reviewed/reinforced, Catharsis/review of pertinent events in supportive environment, Initial/Spiritual assessment, patient floor, Integration of medical assessment with existing values and beliefs, Normalization of emotional/spiritual concerns, Prayer (assurance of), Reframing     Family/Friend(s):  Affirmation of emotions/emotional suffering, Catharsis/review of pertinent events in supportive environment, Coping skills reviewed/reinforced, Normalization of emotional/spiritual concerns, Prayer (assurance of)     Plan of Care:     [] Support spiritual and/or cultural needs    [] Support AMD and/or advance care planning process      [] Support grieving process   [] Coordinate Rites and/or Rituals    [] Coordination with community clergy   [] No spiritual needs identified at this time   [] Detailed Plan of Care below (See Comments)  [] Make referral to Music Therapy  [] Make referral to Pet Therapy     [] Make referral to Addiction services  [] Make referral to [facility]  [] Make referral to Spiritual Care Partner  [] No future visits requested        [x] Follow up upon further referrals     Comments: Follow-up visit to provide initial spiritual assessment for  Guy in CD35. Processed coping mechanisms and feelings of uncertainty. Affirmed desire to provide self care and the conflict between time, doing, and being. 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 Affirmed presence of family and assured of prayers.     [facility], [address] Provider   Paging Service [phone])

## 2021-01-12 NOTE — ED NOTES
Dr Gnozalo Carter reviewed discharge instructions with the patient. The patient verbalized understanding. All questions and concerns were addressed. The patient declined a wheelchair and is discharged ambulatory in the care of family members with instructions and prescriptions in hand. Pt is alert and oriented x 4. Respirations are clear and unlabored.

## 2021-01-12 NOTE — PROGRESS NOTES
Spiritual Care Assessment/Progress Note  [facility]      NAME: Kyra Rodas      MRN: [id number]  AGE: 54 y.o. SEX: female  Synagogue Affiliation: Hindu   Language: English     1/12/2021     Total Time (in minutes): 10     Spiritual Assessment begun in [facility] EMERGENCY DEPT through conversation with:         []Patient        [x] Family    [] Friend(s)        Reason for Consult: Crisis     Spiritual beliefs: (Please include comment if needed)     [x] Identifies with a jeanne tradition:         [] Supported by a jeanne community:            [] Claims no spiritual orientation:           [] Seeking spiritual identity:                [] Adheres to an individual form of spirituality:           [] Not able to assess:                           Identified resources for coping:      [x] Prayer                               [] Music                  [] Guided Imagery     [x] Family/friends                 [] Pet visits     [] Devotional reading                         [] Unknown     [] Other:                                              Interventions offered during this visit: (See comments for more details)          Family/Friend(s):  Affirmation of emotions/emotional suffering, Coping skills reviewed/reinforced, Initial Assessment, Normalization of emotional/spiritual concerns     Plan of Care:     [] Support spiritual and/or cultural needs    [] Support AMD and/or advance care planning process      [] Support grieving process   [] Coordinate Rites and/or Rituals    [] Coordination with community clergy   [x] No spiritual needs identified at this time   [] Detailed Plan of Care below (See Comments)  [] Make referral to Music Therapy  [] Make referral to Pet Therapy     [] Make referral to Addiction services  [] Make referral to [facility]  [] Make referral to Spiritual Care Partner  [] No future visits requested        [x] Follow up upon further referrals     Comments: Responded to Code Stroke page for pt Guy in CD35. Pt was not available at time. Family was in room during pt time in CT. Introduced  services and affirmed gift of presence. No spiritual needs identified at this time. Advised of  services.     LEEANNA Ochoa 1 Provider   Paging Service [phone])

## 2021-01-12 NOTE — Clinical Note
[address] 
[facility] EMERGENCY DEPT 
[address] [facility] [address] [place] [address] [phone] Work/School Note Date: 1/12/2021 To Whom It May concern: 
 
Lennox Hua was seen and treated today in the emergency room by the following provider(s): 
Attending Provider: Estephanie Gutierrez MD.   
 
Lennox Hua is excused from work/school on 01/12/21 and 01/13/21. She is medically clear to return to work/school on 1/14/2021. Sincerely, Jacinto Weiss MD

## 2021-02-05 NOTE — TELEPHONE ENCOUNTER
Spoke with pharmacist at [address] [place] [address],  # [id number]. Verified patient with two patient identifiers. Spoke with Good Carcamo at [facility], [phone]. Verified patient with two patient identifiers. Advised [employer] does NOT need prior auth. Is just a higher tier, pt has higher copay, but does not need prior auth. Pharmacist verbalized understanding.
No

## 2021-09-14 ENCOUNTER — TRANSCRIBE ORDER (OUTPATIENT)
Dept: SCHEDULING | Age: 56
End: 2021-09-14

## 2021-09-14 DIAGNOSIS — Z85.3 HX OF BREAST CANCER: Primary | ICD-10-CM

## 2021-09-15 ENCOUNTER — TRANSCRIBE ORDER (OUTPATIENT)
Dept: SCHEDULING | Age: 56
End: 2021-09-15

## 2021-09-15 DIAGNOSIS — Z80.3 FAMILY HISTORY OF BREAST CANCER: Primary | ICD-10-CM

## 2021-09-30 ENCOUNTER — HOSPITAL ENCOUNTER (OUTPATIENT)
Dept: MAMMOGRAPHY | Age: 56
Discharge: HOME | End: 2021-09-30
Attending: FAMILY MEDICINE
Payer: COMMERCIAL

## 2021-09-30 DIAGNOSIS — Z80.3 FAMILY HISTORY OF BREAST CANCER: ICD-10-CM

## 2021-09-30 PROCEDURE — 77062 BREAST TOMOSYNTHESIS BI: CPT

## 2021-09-30 PROCEDURE — 76642 ULTRASOUND BREAST LIMITED: CPT

## 2022-03-19 PROBLEM — Z79.899 DRUG THERAPY: Status: ACTIVE | Noted: 2018-03-14

## 2022-03-20 PROBLEM — R79.89 ABNORMAL LIVER FUNCTION TESTS: Status: ACTIVE | Noted: 2018-03-14

## 2022-04-27 ENCOUNTER — OFFICE VISIT (OUTPATIENT)
Dept: ORTHOPEDIC SURGERY | Age: 57
End: 2022-04-27

## 2022-04-27 DIAGNOSIS — M25.512 ACUTE PAIN OF LEFT SHOULDER: Primary | ICD-10-CM

## 2022-04-27 PROCEDURE — 99203 OFFICE O/P NEW LOW 30 MIN: CPT | Performed by: ORTHOPAEDIC SURGERY

## 2022-04-27 NOTE — LETTER
4/27/2022    Patient: James Read   YOB: 1965   Date of Visit: 4/27/2022     Lindy Brantley MD  [address] [place] [id number]  Via Fax: [phone]    Dear Lindy Brantley MD,      Thank you for referring Ms. Shon Vinson to [facility] for evaluation. My notes for this consultation are attached. If you have questions, please do not hesitate to call me. I look forward to following your patient along with you.       Sincerely,    Sunny Anderson MD

## 2022-04-27 NOTE — PROGRESS NOTES
[address] (: 1965) is a 62 y.o. female, patient, here for evaluation of the following chief complaint(s):  Shoulder Pain (left shoulder pain)       HPI:    Patient presents the office today with a chief plaint of left shoulder pain. Patient describes discomfort in the anterior aspect of the shoulder more in the clavicle region. She noted this when she bent down to  a pop out of her lower cabinet and felt like something snapped in the front portion of her shoulder area. She is describes this as pretty excruciating pain. Ice seem to help. Now she has recurrent pain with this certain activity. She points along the anterior mid clavicle area as a source of pain. She does not note tenderness over across the outer portion of the shoulder. She denies neck pain denies numbness or tingling    No Known Allergies    Current Outpatient Medications   Medication Sig    meclizine (ANTIVERT) 25 mg tablet Take 1 Tab by mouth three (3) times daily as needed for Dizziness for up to 10 doses. Indications: sensation of spinning or whirling    carvedilol (COREG) 3.125 mg tablet     amoxicillin-clavulanate (AUGMENTIN) 875-125 mg per tablet Take 1 Tab by mouth two (2) times a day.  potassium chloride (K-DUR, KLOR-CON) 20 mEq tablet take 1-2 tablets by mouth twice a day (verified dose with pt)    hydroCHLOROthiazide (HYDRODIURIL) 25 mg tablet take 1 tablet by mouth once daily    losartan (COZAAR) 50 mg tablet take 1 tablet by mouth once daily    rosuvastatin (CRESTOR) 5 mg tablet Take 1 Tab by mouth nightly.  krill oil 500 mg cap Take  by mouth daily.  UBIQUINONE PO Take  by mouth daily.  Mv,Ca,Iron,Min-FA-Phytosterol (CENTRUM CARDIO) 3-200-400 mg-mcg-mg Tab Take  by mouth.  cholecalciferol, vitamin D3, 2,000 unit Tab Take  by mouth.  aspirin 81 mg tablet Take 81 mg by mouth daily. No current facility-administered medications for this visit.        Past Medical History: Diagnosis Date    Edema     Essential hypertension     Essential hypertension, benign     well controlled    Mitral valve disorders(424.0)     Mixed hyperlipidemia         No past surgical history on file. Family History   Problem Relation Age of Onset    Breast Cancer Mother 52        Social History     Socioeconomic History    Marital status:      Spouse name: Not on file    Number of children: Not on file    Years of education: Not on file    Highest education level: Not on file   Occupational History    Not on file   Tobacco Use    Smoking status: Never Smoker    Smokeless tobacco: Never Used   Substance and Sexual Activity    Alcohol use: No    Drug use: Not on file    Sexual activity: Not on file   Other Topics Concern    Not on file   Social History Narrative    Not on file     Social Determinants of Health     Financial Resource Strain:     Difficulty of Paying Living Expenses: Not on file   Food Insecurity:     Worried About Running Out of Food in the Last Year: Not on file    Haylie of Food in the Last Year: Not on file   Transportation Needs:     Lack of Transportation (Medical): Not on file    Lack of Transportation (Non-Medical):  Not on file   Physical Activity:     Days of Exercise per Week: Not on file    Minutes of Exercise per Session: Not on file   Stress:     Feeling of Stress : Not on file   Social Connections:     Frequency of Communication with Friends and Family: Not on file    Frequency of Social Gatherings with Friends and Family: Not on file    Attends Synagogue Services: Not on file    Active Member of Clubs or Organizations: Not on file    Attends Club or Organization Meetings: Not on file    Marital Status: Not on file   Intimate Partner Violence:     Fear of Current or Ex-Partner: Not on file    Emotionally Abused: Not on file    Physically Abused: Not on file    Sexually Abused: Not on file   Housing Stability:     Unable to Pay for Housing in the Last Year: Not on file    Number of Places Lived in the Last Year: Not on file    Unstable Housing in the Last Year: Not on file       Review of Systems   Musculoskeletal:        Left shoulder pain       Vitals:  LMP 06/04/2015 (Approximate)    There is no height or weight on file to calculate BMI. Ortho Exam     Patient is alert and oriented x3. Patient is in no acute distress. Patient ambulates with a nonantalgic gait. Left shoulder: No atrophy is noted. She has full range of motion of the shoulder without crepitation. She has negative impingement sign negative Evans sign. Rotator cuff strength is maintained with forward elevation, lateral abduction and external rotation. She has no pain with cross body adduction. She does have some mild pain over the mid clavicle and at the sternoclavicular joint but there is no deformity and there is no evidence of instability with manipulation of the joint. There is no swelling noted distally. Neurovascular examination is intact. Right shoulder:  No shoulder girdle atrophy . There is no soft tissue swelling, ecchymosis, abrasions or lacerations. Active range of motion is full with forward flexion, lateral abduction and external rotation. Internal rotation is to the upper lumbar level with a negative lift-off sign. Passive range of motion is full with a negative impingement sign and a negative Evans sign. Rotator cuff strength with forward flexion, lateral abduction and external rotation is intact with 5/5 strength. There is no crepitation about the joint. [facility] joint does not reproduce discomfort, and there is no pain elicited with cross-body adduction. Strength of the extremity is 5/5 at biceps/triceps/wrist extension. DRT's are intact at +2/4 and  symmetrical.  Cervical range of motion is full with no pain to palpation along the paraspinal musculature medial border of the scapula.   Spurling's sign is negative. XR Results (most recent):  Results from Appointment encounter on 04/27/22    XR SHOULDER LT AP/LAT MIN 2 V    Narrative  Three-view x-ray of the left shoulder reveals no evidence of fracture dislocation. I do not appreciate any evidence of arthritis of the joint no evidence of impingement       ASSESSMENT/PLAN:    I have gone over the findings with the patient. Her pain is a little bit more clavicle and below clavicle perhaps even the first rib that could be involved. I do not appreciate any structural damage the other option is a muscular injury. Either way, I do not see anything structural at this stage. I would recommend physical therapy particularly with first rib manipulation as this could be helpful for her. Hopefully this will resolve in due time. I would like her to return to the office in 4 weeks. If she has no improvement, further diagnostic testing would be indicated.         Martie Sicard, MD

## 2022-05-02 ENCOUNTER — OFFICE VISIT (OUTPATIENT)
Dept: ORTHOPEDIC SURGERY | Age: 57
End: 2022-05-02
Payer: COMMERCIAL

## 2022-05-02 DIAGNOSIS — M25.512 ACUTE PAIN OF LEFT SHOULDER: Primary | ICD-10-CM

## 2022-05-02 PROCEDURE — 97110 THERAPEUTIC EXERCISES: CPT | Performed by: PHYSICAL THERAPIST

## 2022-05-02 PROCEDURE — 97162 PT EVAL MOD COMPLEX 30 MIN: CPT | Performed by: PHYSICAL THERAPIST

## 2022-05-02 PROCEDURE — 97140 MANUAL THERAPY 1/> REGIONS: CPT | Performed by: PHYSICAL THERAPIST

## 2022-05-13 ENCOUNTER — OFFICE VISIT (OUTPATIENT)
Dept: ORTHOPEDIC SURGERY | Age: 57
End: 2022-05-13
Payer: COMMERCIAL

## 2022-05-13 DIAGNOSIS — M25.512 ACUTE PAIN OF LEFT SHOULDER: Primary | ICD-10-CM

## 2022-05-13 PROCEDURE — 97112 NEUROMUSCULAR REEDUCATION: CPT | Performed by: PHYSICAL THERAPIST

## 2022-05-13 PROCEDURE — 97140 MANUAL THERAPY 1/> REGIONS: CPT | Performed by: PHYSICAL THERAPIST

## 2022-05-13 NOTE — PROGRESS NOTES
Patient Name: Bartolo Kat  Date:2022  : 1965  [x]  Patient  Verified  Payor: Jose C Thomas / Plan: [address] [place] [address] / Product Type: PPO /    Total Treatment Time (min):45+  1:1 Treatment Time ( [address] only):   Referring provider: Rickey Jang MD  1. Acute pain of left shoulder      SUBJECTIVE  Patient reports clavicular pain seems to be improved. States she has been compliant with her home exercise program but continues to have symptoms. In particular, she reports flareup in symptoms over the past 3 days through the scalenes, upper trapezius, cervical paraspinals, and into the base of her skull. States that she has been alternating with heat and ice. States that she has been limiting active range of motion of her cervical spine, as looking forward into cervical flexion causes increased pain. Patient does report that using her cane at home for trigger point techniques does seem helpful. No radicular complaints into the upper extremity. OBJECTIVE  Modality:   []  E-Stim: type _ x _ min     []att   []unatt   []w/US   []w/ice   []w/heat  []  Ultrasound: []cont   []pulse    _ W/cm2 x _  min   []1MHz   []3MHz  []  Ice pack _  Post     declines  [] Hot pack _  Pre       []  Other:    Man: 15 min  Manual cervical traction with suboccipital release. Mobilization of the medial clavicle in supine. Deep trigger point techniques to the vertebral border of the upper thoracic ribs with active range of motion of the left upper extremity performed in supine. Cervical mobilization performed in combination with sidebending and rotation while supine. Upper thoracic rib mobilization performed with cavitation in supine. NMR: 25 min  Neuromuscular reeducation/proprioceptive training listed in exercise below. Manual assisted neuromuscular down regulation techniques to the upper trapezius, scalenes, SCM in supine.   Verbal and tactile cueing for neuromuscular reeducation of scapular retraction in sitting and side-lying. Manual assisted neuromuscular down regulation techniques to the medial scapula and supraspinatus while in side-lying. K taping performed in 2 separate \"I\" strips to the left cervical and thoracic paraspinals as well as left upper trapezius for tactile cueing and neuromuscular reeducation while providing postural cues and decreasing pain during ADL activity. Ex: 5 min  Therapeutic exercise/strength/endurance completed here in clinic today per the exercise log. Instructed patient in neck retraction. Discussed additional instrument assisted trigger point techniques to the upper thoracic paraspinals and vertebral border of rib to add to her home exercise program.             PT Exercise Log         EXERCISE 5/13/2022                                                                                        Ex: 5 min  Man: 15 min  NMR: 30 min    ASSESSMENT  [x]  See Plan of Care  [x]  Patient will continue to benefit from skilled therapy to address remaining functional deficits:   Patient demonstrates improved comfort with cervical active range of motion in sitting at end of session today as compared to arrival.  Still at times guarded with manual techniques. Needs to continue awareness of posture with repetitive ADL tasks. PLAN  Continue with current plan of care and progress as appropriate towards functional goals. [x]  Upgrade activities as tolerated     [x]  Continue plan of care  []  Discharge due to:_  [] Other:_Assess response to today's mobilization techniques and taping and progress as appropriate next session.       Aura Pritchard, PT, DPT  5/13/2022    8:06 AM

## 2022-05-17 ENCOUNTER — OFFICE VISIT (OUTPATIENT)
Dept: ORTHOPEDIC SURGERY | Age: 57
End: 2022-05-17
Payer: COMMERCIAL

## 2022-05-17 DIAGNOSIS — M25.512 ACUTE PAIN OF LEFT SHOULDER: Primary | ICD-10-CM

## 2022-05-17 DIAGNOSIS — M54.2 PAIN OF CERVICAL SPINE: ICD-10-CM

## 2022-05-17 PROCEDURE — 97112 NEUROMUSCULAR REEDUCATION: CPT | Performed by: PHYSICAL THERAPIST

## 2022-05-17 PROCEDURE — 20561 NDL INSJ W/O NJX 3+ MUSC: CPT | Performed by: PHYSICAL THERAPIST

## 2022-05-17 PROCEDURE — 97140 MANUAL THERAPY 1/> REGIONS: CPT | Performed by: PHYSICAL THERAPIST

## 2022-05-17 NOTE — PROGRESS NOTES
Patient Name: Gloria William  Date:2022  : 1965  [x]  Patient  Verified  Payor: Alixdonta Christa / Plan: [address] [place] [address] / Product Type: PPO /    Total Treatment Time (min):45+  1:1 Treatment Time ( [address] only):   Referring provider: Brenda Louis MD  1. Acute pain of left shoulder  2. Pain of cervical spine      SUBJECTIVE  Patient reports that she felt improved for 2 to 3 days after last session but states her pain has returned. Continues with complaints throughout the left cervical thoracic junction. Still has some complaints into the left upper trapezius and scalenes but complains of more pain centered around the mid to upper cervical spine today and suboccipital musculature as well. States that she has been using a lacrosse ball for self trigger point techniques. Continues to use heat on her own. States that she feels taping last session was more \"itchy\" than helpful. OBJECTIVE  Modality:   []  E-Stim: type _ x _ min     []att   []unatt   []w/US   []w/ice   []w/heat  []  Ultrasound: []cont   []pulse    _ W/cm2 x _  min   []1MHz   []3MHz  []  Ice pack _  Post     declines  [x] Hot pack _  Pre       []  Other:    Man: 15 min  Manual cervical traction with suboccipital release. Mobilization of the medial clavicle in supine. Cervical mobilization performed in combination with sidebending and rotation while supine. Upper thoracic/rib mobilization performed in supine without cavitation. NMR: 25 min  Neuromuscular reeducation/proprioceptive training listed in exercise below. Manual assisted neuromuscular down regulation techniques to the scalenes, upper trapezius, suboccipitals, cervical paraspinals, and thoracic paraspinals    DN: 20 min  Dry needling performed to the left upper trapezius, cervical paraspinals, thoracic paraspinals, and supraspinatus with patient in sitting. Informed consent obtained prior to needling. Skin was properly cleansed prior to needle insertion.   30 to 40 mm needles utilized with fascial winding and electrical stimulation. Call bell was within reach throughout today's needling duration. Needles removed in sterile fashion and without incident. Ex:  min  Therapeutic exercise/strength/endurance completed here in clinic today per the exercise log. PT Exercise Log         EXERCISE 5/17/2022                                                                                      Ex:  min  Man: 15 min  NMR: 25 min  DN: 20 min    ASSESSMENT  [x]  See Plan of Care  [x]  Patient will continue to benefit from skilled therapy to address remaining functional deficits:     Still with significant complaints of subjective pain. More symptoms centered around the suboccipitals and upper to mid cervical spine today as compared to previous upper thoracic and first rib complaints. PLAN  Continue with current plan of care and progress as appropriate towards functional goals. [x]  Upgrade activities as tolerated     [x]  Continue plan of care  []  Discharge due to:_  [] Other:_. Assess response to needling and consider changes in needle location to the cervical spine as well.       Urvashi Dsouza, PT, DPT  5/17/2022    8:06 AM

## 2022-10-07 ENCOUNTER — TRANSCRIBE ORDER (OUTPATIENT)
Dept: SCHEDULING | Age: 57
End: 2022-10-07

## 2022-10-07 DIAGNOSIS — C50.919 BREAST CANCER (HCC): Primary | ICD-10-CM

## 2022-11-07 ENCOUNTER — TRANSCRIBE ORDER (OUTPATIENT)
Dept: SCHEDULING | Age: 57
End: 2022-11-07

## 2022-11-07 DIAGNOSIS — Z80.3 FAMILY HISTORY OF BREAST CANCER: Primary | ICD-10-CM

## 2022-11-07 DIAGNOSIS — Z91.89 AT HIGH RISK FOR BREAST CANCER: ICD-10-CM

## 2022-11-17 ENCOUNTER — HOSPITAL ENCOUNTER (OUTPATIENT)
Dept: MRI IMAGING | Age: 57
Discharge: HOME | End: 2022-11-17
Attending: FAMILY MEDICINE
Payer: COMMERCIAL

## 2022-11-17 DIAGNOSIS — Z91.89 AT HIGH RISK FOR BREAST CANCER: ICD-10-CM

## 2022-11-17 DIAGNOSIS — Z80.3 FAMILY HISTORY OF BREAST CANCER: ICD-10-CM

## 2022-11-17 PROCEDURE — A9585 GADOBUTROL INJECTION: HCPCS | Performed by: RADIOLOGY

## 2022-11-17 PROCEDURE — 74011250636 HC RX REV CODE- 250/636: Performed by: RADIOLOGY

## 2022-11-17 PROCEDURE — 77049 MRI BREAST C-+ W/CAD BI: CPT

## 2022-11-17 RX ADMIN — GADOBUTROL 8 ML: 604.72 INJECTION INTRAVENOUS at 11:00

## 2023-07-12 ENCOUNTER — HOSPITAL ENCOUNTER (OUTPATIENT)
Facility: HOSPITAL | Age: 58
Discharge: HOME | End: 2023-07-15
Payer: COMMERCIAL

## 2023-07-12 DIAGNOSIS — N63.10 MASS OF RIGHT BREAST, UNSPECIFIED QUADRANT: ICD-10-CM

## 2023-07-12 PROCEDURE — G0279 TOMOSYNTHESIS, MAMMO: HCPCS

## 2023-07-12 PROCEDURE — 76642 ULTRASOUND BREAST LIMITED: CPT

## 2024-02-23 ENCOUNTER — HOSPITAL ENCOUNTER (OUTPATIENT)
Facility: HOSPITAL | Age: 59
Discharge: HOME | End: 2024-02-23
Payer: COMMERCIAL

## 2024-02-23 DIAGNOSIS — R92.30 BREAST DENSITY: ICD-10-CM

## 2024-02-23 DIAGNOSIS — Z80.3 FH: BREAST CANCER: ICD-10-CM

## 2024-02-23 DIAGNOSIS — Z12.39 BREAST CANCER SCREENING, HIGH RISK PATIENT: ICD-10-CM

## 2024-02-23 PROCEDURE — A9579 GAD-BASE MR CONTRAST NOS,1ML: HCPCS | Performed by: FAMILY MEDICINE

## 2024-02-23 PROCEDURE — C8908 MRI W/O FOL W/CONT, BREAST,: HCPCS

## 2024-02-23 PROCEDURE — 6360000004 HC RX CONTRAST MEDICATION: Performed by: FAMILY MEDICINE

## 2024-02-23 PROCEDURE — 2580000003 HC RX 258: Performed by: FAMILY MEDICINE

## 2024-02-23 RX ORDER — SODIUM CHLORIDE 9 MG/ML
INJECTION, SOLUTION INTRAVENOUS CONTINUOUS
Status: DISCONTINUED | OUTPATIENT
Start: 2024-02-23 | End: 2024-02-27 | Stop reason: HOSPADM

## 2024-02-23 RX ADMIN — SODIUM CHLORIDE 100 ML: 9 INJECTION, SOLUTION INTRAVENOUS at 09:53

## 2024-02-23 RX ADMIN — GADOTERIDOL 20 ML: 279.3 INJECTION, SOLUTION INTRAVENOUS at 09:53

## 2024-03-23 ENCOUNTER — APPOINTMENT (OUTPATIENT)
Facility: HOSPITAL | Age: 59
End: 2024-03-23
Payer: COMMERCIAL

## 2024-03-23 ENCOUNTER — HOSPITAL ENCOUNTER (EMERGENCY)
Facility: HOSPITAL | Age: 59
Discharge: HOME | End: 2024-03-23
Payer: COMMERCIAL

## 2024-03-23 VITALS
BODY MASS INDEX: 35.59 KG/M2 | TEMPERATURE: 97.7 F | RESPIRATION RATE: 19 BRPM | OXYGEN SATURATION: 99 % | WEIGHT: 213.63 LBS | SYSTOLIC BLOOD PRESSURE: 145 MMHG | HEART RATE: 86 BPM | HEIGHT: 65 IN | DIASTOLIC BLOOD PRESSURE: 80 MMHG

## 2024-03-23 DIAGNOSIS — R42 VERTIGO: Primary | ICD-10-CM

## 2024-03-23 DIAGNOSIS — E87.6 HYPOKALEMIA: ICD-10-CM

## 2024-03-23 DIAGNOSIS — R42 DIZZINESS: ICD-10-CM

## 2024-03-23 LAB
ALBUMIN SERPL-MCNC: 3.9 G/DL (ref 3.5–5)
ALBUMIN/GLOB SERPL: 1.1 (ref 1.1–2.2)
ALP SERPL-CCNC: 67 U/L (ref 45–117)
ALT SERPL-CCNC: 21 U/L (ref 12–78)
ANION GAP SERPL CALC-SCNC: 2 MMOL/L (ref 5–15)
AST SERPL-CCNC: 17 U/L (ref 15–37)
BASOPHILS # BLD: 0.1 K/UL (ref 0–0.1)
BASOPHILS NFR BLD: 1 % (ref 0–1)
BILIRUB SERPL-MCNC: 0.6 MG/DL (ref 0.2–1)
BUN SERPL-MCNC: 24 MG/DL (ref 6–20)
BUN/CREAT SERPL: 32 (ref 12–20)
CALCIUM SERPL-MCNC: 9 MG/DL (ref 8.5–10.1)
CHLORIDE SERPL-SCNC: 106 MMOL/L (ref 97–108)
CO2 SERPL-SCNC: 29 MMOL/L (ref 21–32)
CREAT SERPL-MCNC: 0.76 MG/DL (ref 0.55–1.02)
DIFFERENTIAL METHOD BLD: NORMAL
EOSINOPHIL # BLD: 0.2 K/UL (ref 0–0.4)
EOSINOPHIL NFR BLD: 3 % (ref 0–7)
ERYTHROCYTE [DISTWIDTH] IN BLOOD BY AUTOMATED COUNT: 13.5 % (ref 11.5–14.5)
GLOBULIN SER CALC-MCNC: 3.6 G/DL (ref 2–4)
GLUCOSE SERPL-MCNC: 121 MG/DL (ref 65–100)
HCT VFR BLD AUTO: 41.7 % (ref 35–47)
HGB BLD-MCNC: 13.6 G/DL (ref 11.5–16)
IMM GRANULOCYTES # BLD AUTO: 0 K/UL (ref 0–0.04)
IMM GRANULOCYTES NFR BLD AUTO: 0 % (ref 0–0.5)
LYMPHOCYTES # BLD: 1.2 K/UL (ref 0.8–3.5)
LYMPHOCYTES NFR BLD: 21 % (ref 12–49)
MCH RBC QN AUTO: 30.2 PG (ref 26–34)
MCHC RBC AUTO-ENTMCNC: 32.6 G/DL (ref 30–36.5)
MCV RBC AUTO: 92.7 FL (ref 80–99)
MONOCYTES # BLD: 0.4 K/UL (ref 0–1)
MONOCYTES NFR BLD: 6 % (ref 5–13)
NEUTS SEG # BLD: 4.1 K/UL (ref 1.8–8)
NEUTS SEG NFR BLD: 69 % (ref 32–75)
NRBC # BLD: 0 K/UL (ref 0–0.01)
NRBC BLD-RTO: 0 PER 100 WBC
PLATELET # BLD AUTO: 279 K/UL (ref 150–400)
PMV BLD AUTO: 9.4 FL (ref 8.9–12.9)
POTASSIUM SERPL-SCNC: 3.2 MMOL/L (ref 3.5–5.1)
PROT SERPL-MCNC: 7.5 G/DL (ref 6.4–8.2)
RBC # BLD AUTO: 4.5 M/UL (ref 3.8–5.2)
SODIUM SERPL-SCNC: 137 MMOL/L (ref 136–145)
TROPONIN I SERPL HS-MCNC: 4 NG/L (ref 0–51)
WBC # BLD AUTO: 6 K/UL (ref 3.6–11)

## 2024-03-23 PROCEDURE — 93005 ELECTROCARDIOGRAM TRACING: CPT | Performed by: PHYSICIAN ASSISTANT

## 2024-03-23 PROCEDURE — 70498 CT ANGIOGRAPHY NECK: CPT

## 2024-03-23 PROCEDURE — 99285 EMERGENCY DEPT VISIT HI MDM: CPT

## 2024-03-23 PROCEDURE — 70551 MRI BRAIN STEM W/O DYE: CPT

## 2024-03-23 PROCEDURE — 36415 COLL VENOUS BLD VENIPUNCTURE: CPT

## 2024-03-23 PROCEDURE — 6370000000 HC RX 637 (ALT 250 FOR IP): Performed by: PHYSICIAN ASSISTANT

## 2024-03-23 PROCEDURE — 85025 COMPLETE CBC W/AUTO DIFF WBC: CPT

## 2024-03-23 PROCEDURE — 84484 ASSAY OF TROPONIN QUANT: CPT

## 2024-03-23 PROCEDURE — 80053 COMPREHEN METABOLIC PANEL: CPT

## 2024-03-23 PROCEDURE — 70450 CT HEAD/BRAIN W/O DYE: CPT

## 2024-03-23 PROCEDURE — 6360000004 HC RX CONTRAST MEDICATION: Performed by: EMERGENCY MEDICINE

## 2024-03-23 RX ORDER — POTASSIUM CHLORIDE 20 MEQ/1
20 TABLET, EXTENDED RELEASE ORAL ONCE
Status: COMPLETED | OUTPATIENT
Start: 2024-03-23 | End: 2024-03-23

## 2024-03-23 RX ORDER — MECLIZINE HCL 12.5 MG/1
25 TABLET ORAL ONCE
Status: COMPLETED | OUTPATIENT
Start: 2024-03-23 | End: 2024-03-23

## 2024-03-23 RX ORDER — MECLIZINE HYDROCHLORIDE 25 MG/1
25 TABLET ORAL 3 TIMES DAILY PRN
Qty: 15 TABLET | Refills: 0 | Status: SHIPPED | OUTPATIENT
Start: 2024-03-23 | End: 2024-04-02

## 2024-03-23 RX ADMIN — IOPAMIDOL 100 ML: 755 INJECTION, SOLUTION INTRAVENOUS at 09:14

## 2024-03-23 RX ADMIN — MECLIZINE 25 MG: 12.5 TABLET ORAL at 08:05

## 2024-03-23 RX ADMIN — POTASSIUM CHLORIDE 20 MEQ: 1500 TABLET, EXTENDED RELEASE ORAL at 10:03

## 2024-03-23 ASSESSMENT — PAIN SCALES - GENERAL: PAINLEVEL_OUTOF10: 0

## 2024-03-23 NOTE — CONSULTS
Called to review MRI brain.    I reviewed current MRI and previous MRI from 2021.  There are chronic microvascular changes that are consistent with age.  CSF inferior right parietal lobe unchanged compared to previous study and without mass effect.  Agree with reading:  No significant abnormality or acute process.  No need for neurosurgical follow up.    Thank you for this consultation.

## 2024-03-23 NOTE — ED PROVIDER NOTES
degrees    Diagnosis       Normal sinus rhythm  Nonspecific T wave abnormality  When compared with ECG of 12-JAN-2021 11:20,  No significant change was found     CBC with Auto Differential    Collection Time: 03/23/24  8:19 AM   Result Value Ref Range    WBC 6.0 3.6 - 11.0 K/uL    RBC 4.50 3.80 - 5.20 M/uL    Hemoglobin 13.6 11.5 - 16.0 g/dL    Hematocrit 41.7 35.0 - 47.0 %    MCV 92.7 80.0 - 99.0 FL    MCH 30.2 26.0 - 34.0 PG    MCHC 32.6 30.0 - 36.5 g/dL    RDW 13.5 11.5 - 14.5 %    Platelets 279 150 - 400 K/uL    MPV 9.4 8.9 - 12.9 FL    Nucleated RBCs 0.0 0  WBC    nRBC 0.00 0.00 - 0.01 K/uL    Neutrophils % 69 32 - 75 %    Lymphocytes % 21 12 - 49 %    Monocytes % 6 5 - 13 %    Eosinophils % 3 0 - 7 %    Basophils % 1 0 - 1 %    Immature Granulocytes 0 0.0 - 0.5 %    Neutrophils Absolute 4.1 1.8 - 8.0 K/UL    Lymphocytes Absolute 1.2 0.8 - 3.5 K/UL    Monocytes Absolute 0.4 0.0 - 1.0 K/UL    Eosinophils Absolute 0.2 0.0 - 0.4 K/UL    Basophils Absolute 0.1 0.0 - 0.1 K/UL    Absolute Immature Granulocyte 0.0 0.00 - 0.04 K/UL    Differential Type AUTOMATED     CMP    Collection Time: 03/23/24  8:19 AM   Result Value Ref Range    Sodium 137 136 - 145 mmol/L    Potassium 3.2 (L) 3.5 - 5.1 mmol/L    Chloride 106 97 - 108 mmol/L    CO2 29 21 - 32 mmol/L    Anion Gap 2 (L) 5 - 15 mmol/L    Glucose 121 (H) 65 - 100 mg/dL    BUN 24 (H) 6 - 20 MG/DL    Creatinine 0.76 0.55 - 1.02 MG/DL    Bun/Cre Ratio 32 (H) 12 - 20      Est, Glom Filt Rate >60 >60 ml/min/1.73m2    Calcium 9.0 8.5 - 10.1 MG/DL    Total Bilirubin 0.6 0.2 - 1.0 MG/DL    ALT 21 12 - 78 U/L    AST 17 15 - 37 U/L    Alk Phosphatase 67 45 - 117 U/L    Total Protein 7.5 6.4 - 8.2 g/dL    Albumin 3.9 3.5 - 5.0 g/dL    Globulin 3.6 2.0 - 4.0 g/dL    Albumin/Globulin Ratio 1.1 1.1 - 2.2     Troponin    Collection Time: 03/23/24  8:19 AM   Result Value Ref Range    Troponin, High Sensitivity 4 0 - 51 ng/L       EKG: When ordered, EKG's are interpreted by  name was added. Make sure you understand how and when to take each.     * meclizine 25 MG tablet  Commonly known as: ANTIVERT  Take 1 tablet by mouth 3 times daily as needed for Dizziness  What changed: You were already taking a medication with the same name, and this prescription was added. Make sure you understand how and when to take each.           * This list has 2 medication(s) that are the same as other medications prescribed for you. Read the directions carefully, and ask your doctor or other care provider to review them with you.                ASK your doctor about these medications      amoxicillin-clavulanate 875-125 MG per tablet  Commonly known as: AUGMENTIN     carvedilol 3.125 MG tablet  Commonly known as: COREG     Cholecalciferol 50 MCG (2000 UT) Tabs     hydroCHLOROthiazide 25 MG tablet  Commonly known as: HYDRODIURIL     losartan 50 MG tablet  Commonly known as: COZAAR     Omega-3 500 MG Caps     potassium chloride 20 MEQ extended release tablet  Commonly known as: KLOR-CON M     rosuvastatin 5 MG tablet  Commonly known as: CRESTOR     UBIQUINONE PO               Where to Get Your Medications        These medications were sent to [id number] - [place], [place] - [address] [facility] [address] [facility] - P [phone] - F [phone] [address] [facility] [address]      Phone: [phone]   meclizine 25 MG tablet           DISCONTINUED MEDICATIONS:  Current Discharge Medication List          I have seen and evaluated the patient autonomously. My supervision physician was on site and available for consultation if needed.     I am the Primary Clinician of Record.   Nara Rowland PA-C (electronically signed)    (Please note that parts of this dictation were completed with voice recognition software. Quite often unanticipated grammatical, syntax, homophones, and other interpretive errors are inadvertently transcribed by the computer software. Please disregards these

## 2024-03-24 LAB
EKG ATRIAL RATE: 93 BPM
EKG DIAGNOSIS: NORMAL
EKG P AXIS: 23 DEGREES
EKG P-R INTERVAL: 166 MS
EKG Q-T INTERVAL: 378 MS
EKG QRS DURATION: 96 MS
EKG QTC CALCULATION (BAZETT): 469 MS
EKG R AXIS: -10 DEGREES
EKG T AXIS: -4 DEGREES
EKG VENTRICULAR RATE: 93 BPM

## 2024-04-05 ENCOUNTER — TRANSCRIBE ORDERS (OUTPATIENT)
Facility: HOSPITAL | Age: 59
End: 2024-04-05

## 2024-04-05 DIAGNOSIS — M25.511 RIGHT SHOULDER PAIN, UNSPECIFIED CHRONICITY: Primary | ICD-10-CM

## 2024-04-25 ENCOUNTER — TELEPHONE (OUTPATIENT)
Age: 59
End: 2024-04-25

## 2024-04-25 NOTE — TELEPHONE ENCOUNTER
Called pt ,verified with two pt identifiers, advised pt calling to make an appt with . made appt for 5/8/24 at 9:20 am. She verbalized understanding and did not need our office address-as she is a nurse at [employer].

## 2024-05-08 ENCOUNTER — OFFICE VISIT (OUTPATIENT)
Age: 59
End: 2024-05-08
Payer: COMMERCIAL

## 2024-05-08 VITALS
OXYGEN SATURATION: 99 % | SYSTOLIC BLOOD PRESSURE: 142 MMHG | HEIGHT: 65 IN | BODY MASS INDEX: 34.66 KG/M2 | RESPIRATION RATE: 15 BRPM | HEART RATE: 85 BPM | DIASTOLIC BLOOD PRESSURE: 82 MMHG | WEIGHT: 208 LBS

## 2024-05-08 DIAGNOSIS — I10 PRIMARY HYPERTENSION: ICD-10-CM

## 2024-05-08 DIAGNOSIS — I67.9 CEREBRAL VASCULAR DISEASE: Primary | ICD-10-CM

## 2024-05-08 PROCEDURE — 3079F DIAST BP 80-89 MM HG: CPT | Performed by: PSYCHIATRY & NEUROLOGY

## 2024-05-08 PROCEDURE — 99205 OFFICE O/P NEW HI 60 MIN: CPT | Performed by: PSYCHIATRY & NEUROLOGY

## 2024-05-08 PROCEDURE — 3077F SYST BP >= 140 MM HG: CPT | Performed by: PSYCHIATRY & NEUROLOGY

## 2024-05-08 RX ORDER — ASPIRIN 81 MG/1
1 TABLET ORAL
COMMUNITY

## 2024-05-08 ASSESSMENT — PATIENT HEALTH QUESTIONNAIRE - PHQ9
SUM OF ALL RESPONSES TO PHQ QUESTIONS 1-9: 0
2. FEELING DOWN, DEPRESSED OR HOPELESS: NOT AT ALL
SUM OF ALL RESPONSES TO PHQ QUESTIONS 1-9: 0
SUM OF ALL RESPONSES TO PHQ9 QUESTIONS 1 & 2: 0
SUM OF ALL RESPONSES TO PHQ QUESTIONS 1-9: 0
SUM OF ALL RESPONSES TO PHQ QUESTIONS 1-9: 0
1. LITTLE INTEREST OR PLEASURE IN DOING THINGS: NOT AT ALL

## 2024-05-08 NOTE — PROGRESS NOTES
1. Have you been to the ER, urgent care clinic since your last visit?  Hospitalized since your last visit?  Seen on 3/23/24    2. Have you seen or consulted any other health care providers outside of the [facility] since your last visit?  Include any pap smears or colon screening.   No.      Chief Complaint   Patient presents with    New Patient     MRI results and to discuss       
for my review from March 2024 include a sodium 137, potassium 3.2, creatinine 0.76.  AST of 70 and ALT of 21.    I did independently review the head CT from March 23, 2024.  There is no acute abnormalities.  On the CTA there was no large vessel or significant flow-limiting stenosis    I did independently review the brain MRI from March 23, 2024.  There is no significant abnormalities or acute process.  There was few scattered T2 hyperintensities in the white matter.  There was also few subtle punctate foci of chronic microhemorrhages.      Past Medical History:   Diagnosis Date    Edema     Essential hypertension     Essential hypertension, benign     well controlled    Mitral valve disorders(424.0)     Mixed hyperlipidemia       No pertinent past surgical history    Family History   Problem Relation Age of Onset    Breast Cancer Mother 47    Cerebral Aneurysm Mother     Mom had an aneurysm      Social History     Tobacco Use    Smoking status: Never    Smokeless tobacco: Never   Substance Use Topics    Alcohol use: No        Allergies   Allergen Reactions    Epinephrine Other (See Comments)     Stood up and everything \"on fire\" after a dental procedure        Prior to Admission medications    Medication Sig Start Date End Date Taking? Authorizing Provider   aspirin 81 MG EC tablet Take 1 tablet by mouth Every Day   Yes Provider, Historical, MD   UBIQUINONE PO Take by mouth daily   Yes Automatic Reconciliation, Ar   carvedilol (COREG) 3.125 MG tablet Take 1 tablet by mouth 2 times daily 12/6/19  Yes Automatic Reconciliation, Ar   Cholecalciferol 50 MCG (2000 UT) TABS Take by mouth daily   Yes Automatic Reconciliation, Ar   hydroCHLOROthiazide (HYDRODIURIL) 25 MG tablet take 1 tablet by mouth once daily 8/26/18  Yes Automatic Reconciliation, Ar   losartan (COZAAR) 50 MG tablet take 1 tablet by mouth once daily 7/26/18  Yes Automatic Reconciliation, Ar   potassium chloride (KLOR-CON M) 20 MEQ extended release tablet 1

## 2024-09-16 ENCOUNTER — HOSPITAL ENCOUNTER (OUTPATIENT)
Facility: HOSPITAL | Age: 59
Discharge: HOME | End: 2024-09-19
Attending: ORTHOPAEDIC SURGERY
Payer: COMMERCIAL

## 2024-09-16 DIAGNOSIS — S90.02XA CONTUSION OF LEFT ANKLE, INITIAL ENCOUNTER: ICD-10-CM

## 2024-09-16 DIAGNOSIS — M19.072 ARTHRITIS OF LEFT ANKLE: ICD-10-CM

## 2024-09-16 DIAGNOSIS — S82.52XA CLOSED AVULSION FRACTURE OF MEDIAL MALLEOLUS OF LEFT TIBIA, INITIAL ENCOUNTER: ICD-10-CM

## 2024-09-16 PROCEDURE — 73721 MRI JNT OF LWR EXTRE W/O DYE: CPT

## 2024-10-01 ENCOUNTER — TRANSCRIBE ORDERS (OUTPATIENT)
Facility: HOSPITAL | Age: 59
End: 2024-10-01

## 2024-10-01 DIAGNOSIS — R92.30 DENSE BREASTS: Primary | ICD-10-CM

## 2024-11-01 ENCOUNTER — HOSPITAL ENCOUNTER (OUTPATIENT)
Facility: HOSPITAL | Age: 59
Discharge: HOME | End: 2024-11-04
Attending: FAMILY MEDICINE
Payer: COMMERCIAL

## 2024-11-01 VITALS — BODY MASS INDEX: 34.16 KG/M2 | WEIGHT: 205 LBS | HEIGHT: 65 IN

## 2024-11-01 DIAGNOSIS — Z12.31 VISIT FOR SCREENING MAMMOGRAM: ICD-10-CM

## 2024-11-01 PROCEDURE — 77063 BREAST TOMOSYNTHESIS BI: CPT

## 2024-11-26 ENCOUNTER — TRANSCRIBE ORDERS (OUTPATIENT)
Facility: HOSPITAL | Age: 59
End: 2024-11-26

## 2024-11-26 ENCOUNTER — HOSPITAL ENCOUNTER (OUTPATIENT)
Facility: HOSPITAL | Age: 59
Discharge: HOME | End: 2024-11-29
Payer: COMMERCIAL

## 2024-11-26 DIAGNOSIS — M54.30 SCIATICA, UNSPECIFIED LATERALITY: ICD-10-CM

## 2024-11-26 DIAGNOSIS — M54.40 LOW BACK PAIN WITH SCIATICA, SCIATICA LATERALITY UNSPECIFIED, UNSPECIFIED BACK PAIN LATERALITY, UNSPECIFIED CHRONICITY: ICD-10-CM

## 2024-11-26 DIAGNOSIS — M54.40 LOW BACK PAIN WITH SCIATICA, SCIATICA LATERALITY UNSPECIFIED, UNSPECIFIED BACK PAIN LATERALITY, UNSPECIFIED CHRONICITY: Primary | ICD-10-CM

## 2024-11-26 PROCEDURE — 72100 X-RAY EXAM L-S SPINE 2/3 VWS: CPT

## 2024-12-17 ENCOUNTER — HOSPITAL ENCOUNTER (OUTPATIENT)
Facility: HOSPITAL | Age: 59
Discharge: HOME | End: 2024-12-20
Attending: FAMILY MEDICINE
Payer: COMMERCIAL

## 2024-12-17 DIAGNOSIS — M54.16 LUMBAR RADICULOPATHY: ICD-10-CM

## 2024-12-17 PROCEDURE — 72148 MRI LUMBAR SPINE W/O DYE: CPT

## 2025-03-11 ENCOUNTER — TRANSCRIBE ORDERS (OUTPATIENT)
Facility: HOSPITAL | Age: 60
End: 2025-03-11

## 2025-03-11 DIAGNOSIS — R92.30 DENSE BREAST: Primary | ICD-10-CM

## 2025-03-11 DIAGNOSIS — Z80.3 FAMILY HISTORY OF BREAST CANCER: ICD-10-CM

## 2025-06-03 ENCOUNTER — HOSPITAL ENCOUNTER (OUTPATIENT)
Facility: HOSPITAL | Age: 60
Discharge: HOME | End: 2025-06-06
Attending: FAMILY MEDICINE
Payer: COMMERCIAL

## 2025-06-03 DIAGNOSIS — R92.30 DENSE BREAST: ICD-10-CM

## 2025-06-03 DIAGNOSIS — Z80.3 FAMILY HISTORY OF BREAST CANCER: ICD-10-CM

## 2025-06-03 PROCEDURE — 2580000003 HC RX 258: Performed by: FAMILY MEDICINE

## 2025-06-03 PROCEDURE — A9579 GAD-BASE MR CONTRAST NOS,1ML: HCPCS | Performed by: FAMILY MEDICINE

## 2025-06-03 PROCEDURE — 6360000004 HC RX CONTRAST MEDICATION: Performed by: FAMILY MEDICINE

## 2025-06-03 PROCEDURE — C8908 MRI W/O FOL W/CONT, BREAST,: HCPCS

## 2025-06-03 RX ORDER — 0.9 % SODIUM CHLORIDE 0.9 %
100 INTRAVENOUS SOLUTION INTRAVENOUS ONCE
Status: COMPLETED | OUTPATIENT
Start: 2025-06-03 | End: 2025-06-03

## 2025-06-03 RX ADMIN — GADOTERIDOL 20 ML: 279.3 INJECTION, SOLUTION INTRAVENOUS at 13:36

## 2025-06-03 RX ADMIN — SODIUM CHLORIDE 100 ML: 9 INJECTION, SOLUTION INTRAVENOUS at 13:37

## 2025-08-14 ENCOUNTER — APPOINTMENT (OUTPATIENT)
Facility: HOSPITAL | Age: 60
End: 2025-08-14
Payer: COMMERCIAL

## 2025-08-14 ENCOUNTER — HOSPITAL ENCOUNTER (EMERGENCY)
Facility: HOSPITAL | Age: 60
Discharge: HOME | End: 2025-08-14
Payer: COMMERCIAL

## 2025-08-14 VITALS
TEMPERATURE: 97.7 F | HEART RATE: 81 BPM | DIASTOLIC BLOOD PRESSURE: 80 MMHG | SYSTOLIC BLOOD PRESSURE: 161 MMHG | OXYGEN SATURATION: 100 % | RESPIRATION RATE: 16 BRPM

## 2025-08-14 DIAGNOSIS — N20.0 KIDNEY STONE: Primary | ICD-10-CM

## 2025-08-14 LAB
ANION GAP SERPL CALC-SCNC: 15 MMOL/L (ref 2–14)
APPEARANCE UR: CLEAR
BACTERIA URNS QL MICRO: NEGATIVE /HPF
BASOPHILS # BLD: 0.09 K/UL (ref 0–0.1)
BASOPHILS NFR BLD: 1 % (ref 0–1)
BILIRUB UR QL: NEGATIVE
BUN SERPL-MCNC: 23 MG/DL (ref 8–23)
BUN/CREAT SERPL: 27 (ref 12–20)
CALCIUM SERPL-MCNC: 9.6 MG/DL (ref 8.8–10.2)
CHLORIDE SERPL-SCNC: 98 MMOL/L (ref 98–107)
CO2 SERPL-SCNC: 25 MMOL/L (ref 20–29)
COLOR UR: ABNORMAL
CREAT SERPL-MCNC: 0.85 MG/DL (ref 0.6–1)
DIFFERENTIAL METHOD BLD: ABNORMAL
EOSINOPHIL # BLD: 0.19 K/UL (ref 0–0.4)
EOSINOPHIL NFR BLD: 2.2 % (ref 0–7)
EPITH CASTS URNS QL MICRO: ABNORMAL /LPF
ERYTHROCYTE [DISTWIDTH] IN BLOOD BY AUTOMATED COUNT: 13.6 % (ref 11.5–14.5)
GLUCOSE SERPL-MCNC: 127 MG/DL (ref 65–100)
GLUCOSE UR STRIP.AUTO-MCNC: NEGATIVE MG/DL
HCT VFR BLD AUTO: 40.1 % (ref 35–47)
HGB BLD-MCNC: 13.3 G/DL (ref 11.5–16)
HGB UR QL STRIP: NEGATIVE
IMM GRANULOCYTES # BLD AUTO: 0.03 K/UL (ref 0–0.04)
IMM GRANULOCYTES NFR BLD AUTO: 0.3 % (ref 0–0.5)
KETONES UR QL STRIP.AUTO: NEGATIVE MG/DL
LEUKOCYTE ESTERASE UR QL STRIP.AUTO: ABNORMAL
LYMPHOCYTES # BLD: 1.37 K/UL (ref 0.8–3.5)
LYMPHOCYTES NFR BLD: 15.6 % (ref 12–49)
MCH RBC QN AUTO: 30.2 PG (ref 26–34)
MCHC RBC AUTO-ENTMCNC: 33.2 G/DL (ref 30–36.5)
MCV RBC AUTO: 91.1 FL (ref 80–99)
MONOCYTES # BLD: 0.48 K/UL (ref 0–1)
MONOCYTES NFR BLD: 5.5 % (ref 5–13)
NEUTS SEG # BLD: 6.62 K/UL (ref 1.8–8)
NEUTS SEG NFR BLD: 75.4 % (ref 32–75)
NITRITE UR QL STRIP.AUTO: NEGATIVE
NRBC # BLD: 0 K/UL (ref 0–0.01)
NRBC BLD-RTO: 0 PER 100 WBC
PH UR STRIP: 7 (ref 5–8)
PLATELET # BLD AUTO: 259 K/UL (ref 150–400)
PMV BLD AUTO: 9.2 FL (ref 8.9–12.9)
POTASSIUM SERPL-SCNC: 3.6 MMOL/L (ref 3.5–5.1)
PROT UR STRIP-MCNC: NEGATIVE MG/DL
RBC # BLD AUTO: 4.4 M/UL (ref 3.8–5.2)
RBC #/AREA URNS HPF: ABNORMAL /HPF (ref 0–5)
SODIUM SERPL-SCNC: 139 MMOL/L (ref 136–145)
SP GR UR REFRACTOMETRY: 1.02
URINE CULTURE IF INDICATED: ABNORMAL
UROBILINOGEN UR QL STRIP.AUTO: 0.2 EU/DL (ref 0.2–1)
WBC # BLD AUTO: 8.8 K/UL (ref 3.6–11)
WBC URNS QL MICRO: ABNORMAL /HPF (ref 0–4)

## 2025-08-14 PROCEDURE — 80048 BASIC METABOLIC PNL TOTAL CA: CPT

## 2025-08-14 PROCEDURE — 36415 COLL VENOUS BLD VENIPUNCTURE: CPT

## 2025-08-14 PROCEDURE — 96374 THER/PROPH/DIAG INJ IV PUSH: CPT

## 2025-08-14 PROCEDURE — 96375 TX/PRO/DX INJ NEW DRUG ADDON: CPT

## 2025-08-14 PROCEDURE — 99284 EMERGENCY DEPT VISIT MOD MDM: CPT

## 2025-08-14 PROCEDURE — 6360000002 HC RX W HCPCS: Performed by: PHYSICIAN ASSISTANT

## 2025-08-14 PROCEDURE — 81001 URINALYSIS AUTO W/SCOPE: CPT

## 2025-08-14 PROCEDURE — 85025 COMPLETE CBC W/AUTO DIFF WBC: CPT

## 2025-08-14 PROCEDURE — 74176 CT ABD & PELVIS W/O CONTRAST: CPT

## 2025-08-14 RX ORDER — TAMSULOSIN HYDROCHLORIDE 0.4 MG/1
0.4 CAPSULE ORAL DAILY
Qty: 7 CAPSULE | Refills: 0 | Status: SHIPPED | OUTPATIENT
Start: 2025-08-14 | End: 2025-08-21

## 2025-08-14 RX ORDER — OXYCODONE HYDROCHLORIDE 5 MG/1
5 TABLET ORAL EVERY 6 HOURS PRN
Qty: 12 TABLET | Refills: 0 | Status: SHIPPED | OUTPATIENT
Start: 2025-08-14 | End: 2025-08-17

## 2025-08-14 RX ORDER — MORPHINE SULFATE 4 MG/ML
4 INJECTION, SOLUTION INTRAMUSCULAR; INTRAVENOUS
Status: COMPLETED | OUTPATIENT
Start: 2025-08-14 | End: 2025-08-14

## 2025-08-14 RX ORDER — ONDANSETRON 2 MG/ML
4 INJECTION INTRAMUSCULAR; INTRAVENOUS
Status: COMPLETED | OUTPATIENT
Start: 2025-08-14 | End: 2025-08-14

## 2025-08-14 RX ORDER — KETOROLAC TROMETHAMINE 30 MG/ML
30 INJECTION, SOLUTION INTRAMUSCULAR; INTRAVENOUS
Status: COMPLETED | OUTPATIENT
Start: 2025-08-14 | End: 2025-08-14

## 2025-08-14 RX ORDER — ONDANSETRON 4 MG/1
4 TABLET, ORALLY DISINTEGRATING ORAL 3 TIMES DAILY PRN
Qty: 21 TABLET | Refills: 0 | Status: SHIPPED | OUTPATIENT
Start: 2025-08-14

## 2025-08-14 RX ORDER — KETOROLAC TROMETHAMINE 10 MG/1
10 TABLET, FILM COATED ORAL EVERY 6 HOURS PRN
Qty: 20 TABLET | Refills: 0 | Status: SHIPPED | OUTPATIENT
Start: 2025-08-14

## 2025-08-14 RX ADMIN — ONDANSETRON 4 MG: 2 INJECTION, SOLUTION INTRAMUSCULAR; INTRAVENOUS at 08:18

## 2025-08-14 RX ADMIN — MORPHINE SULFATE 4 MG: 4 INJECTION, SOLUTION INTRAMUSCULAR; INTRAVENOUS at 10:45

## 2025-08-14 RX ADMIN — KETOROLAC TROMETHAMINE 30 MG: 30 INJECTION, SOLUTION INTRAMUSCULAR at 08:18

## 2025-08-14 ASSESSMENT — PAIN SCALES - GENERAL
PAINLEVEL_OUTOF10: 8
PAINLEVEL_OUTOF10: 6
PAINLEVEL_OUTOF10: 8

## 2025-08-14 ASSESSMENT — PAIN DESCRIPTION - LOCATION
LOCATION: FLANK
LOCATION: FLANK

## 2025-08-14 ASSESSMENT — PAIN - FUNCTIONAL ASSESSMENT
PAIN_FUNCTIONAL_ASSESSMENT: 0-10
PAIN_FUNCTIONAL_ASSESSMENT: 0-10

## 2025-08-14 ASSESSMENT — PAIN DESCRIPTION - ORIENTATION
ORIENTATION: LEFT
ORIENTATION: LEFT